# Patient Record
Sex: MALE | Race: WHITE | NOT HISPANIC OR LATINO | Employment: FULL TIME | URBAN - METROPOLITAN AREA
[De-identification: names, ages, dates, MRNs, and addresses within clinical notes are randomized per-mention and may not be internally consistent; named-entity substitution may affect disease eponyms.]

---

## 2021-02-10 ENCOUNTER — SLEEP CENTER VISIT (OUTPATIENT)
Dept: SLEEP MEDICINE | Facility: MEDICAL CENTER | Age: 41
End: 2021-02-10
Payer: COMMERCIAL

## 2021-02-10 VITALS
RESPIRATION RATE: 14 BRPM | BODY MASS INDEX: 23.28 KG/M2 | OXYGEN SATURATION: 98 % | HEIGHT: 69 IN | DIASTOLIC BLOOD PRESSURE: 84 MMHG | SYSTOLIC BLOOD PRESSURE: 128 MMHG | HEART RATE: 78 BPM | WEIGHT: 157.2 LBS

## 2021-02-10 DIAGNOSIS — G47.30 SLEEP DISORDER BREATHING: ICD-10-CM

## 2021-02-10 DIAGNOSIS — F51.5 NIGHTMARES: ICD-10-CM

## 2021-02-10 DIAGNOSIS — G47.10 HYPERSOMNIA: ICD-10-CM

## 2021-02-10 PROCEDURE — 99204 OFFICE O/P NEW MOD 45 MIN: CPT | Performed by: FAMILY MEDICINE

## 2021-02-10 RX ORDER — OMEPRAZOLE 40 MG/1
40 CAPSULE, DELAYED RELEASE ORAL
COMMUNITY
Start: 2021-02-05 | End: 2021-05-06

## 2021-02-10 NOTE — PROGRESS NOTES
"  [facility]  Consult Note     Date: 2/10/2021 / Time: 2:11 PM    Patient ID:   Name:             Mert Carl   YOB: 1980  Age:                 40 y.o.  male   MRN:               [id number]      Thank you for requesting a sleep medicine consultation on Mert Carl at the sleep center. He presents today with the chief complaints of snoring, apneas, sleep walking, daytime sleepiness,  Night terrors and nightmares. He is referred by Cristina Appiah for evaluation and treatment of sleep disorder breathing.     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 At night,  Mert Carl goes to bed around 5-7 am on weekdays and on the weekends. He gets out of bed at 2 pm on weekdays and on the weekends.  He averages about 8-10 hrs of sleep on a good night and 4 hrs on a bad night. Pt has bad nights 1 night per week. He falls asleep within 1 minute. He wakes up about 3-4 times during the night due to no obvious reason and on average It takes him couple min to fall back asleep.    He is aware of snoring/breathing pauses/gasping or choking in sleep.  He  denies any symptoms of restless legs syndrome such as an \"urge to move\"  He  legs in the evening or bedtime. He  denies any symptoms of narcolepsy such as sleep paralysis or cataplexy, or any symptoms to suggest parasomnias such as acting out of dreams. He has nightmares and terrors. Twice a week. He has a hx of anxiety. Sometimes he remember his dream and other days he doesn't remember. He also has hx of sleep walking. He has been sleep walking since child botello. Currently it is decrease to once ever 6 months. He  has not used any medications for the sleep problem.    Overall, michelle does finds his sleep refreshing.  In terms of  excessive daytime sleepiness,  He complains of sleepiness while  at work, while reading or watching TV and while driving. He does take regular naps. The naps are usually 60 min " "long.He drinks about 1 caffeinated beverages per day.      REVIEW OF SYSTEMS:       Constitutional: Denies fevers, Denies weight changes  Eyes: Denies changes in vision, no eye pain  Ears/Nose/Throat/Mouth: Denies nasal congestion or sore throat   Cardiovascular: Denies chest pain or palpitations   Respiratory: Denies shortness of breath , Denies cough  Gastrointestinal/Hepatic: Denies abdominal pain, nausea, vomiting  Musculoskeletal/Rheum: Denies  joint pain and swelling   Skin/Breast: Denies rash  Neurological: Denies headache, confusion, memory loss or focal weakness/parasthesias  Psychiatric: denies mood disorder     Comprehensive review of systems form is reviewed with the patient and is attached in the EMR.     PMH:  has a past medical history of Chickenpox.  MEDS:   Current Outpatient Medications:   •  omeprazole (PRILOSEC) 40 MG delayed-release capsule, Take 40 mg by mouth., Disp: , Rfl:   ALLERGIES: No Known Allergies  SURGHX: History reviewed. No pertinent surgical history.  SOCHX:  reports that he has never smoked. He has never used smokeless tobacco. He reports current alcohol use. He reports previous drug use.  FH:   Family History   Problem Relation Age of Onset   • Sleep Apnea Neg Hx        Physical Exam:  Vitals/ General Appearance:   Weight/BMI: Body mass index is 23.21 kg/m².  /84 (BP Location: Right arm, Patient Position: Sitting, BP Cuff Size: Adult)   Pulse 78   Resp 14   Ht 1.753 m (5' 9\")   Wt 71.3 kg (157 lb 3.2 oz)   SpO2 98%   Vitals:    02/10/21 1359   BP: 128/84   BP Location: Right arm   Patient Position: Sitting   BP Cuff Size: Adult   Pulse: 78   Resp: 14   SpO2: 98%   Weight: 71.3 kg (157 lb 3.2 oz)   Height: 1.753 m (5' 9\")           Constitutional: Alert, no distress, well-groomed.  Skin: No rashes in visible areas.  Eye: Round. Conjunctiva clear, lids normal. No icterus.   ENMT: Lips pink without lesions, good dentition, moist mucous membranes. Phonation normal.  Neck: " No masses, no thyromegaly. Moves freely without pain.  CV: Pulse as reported by patient  Respiratory: Unlabored respiratory effort, no cough or audible wheeze  Psych: Alert and oriented x3, normal affect and mood.   INVESTIGATIONS:       ASSESSMENT AND PLAN     1. He  has symptoms of Obstructive Sleep Apnea (JUAN MANUEL).     The pathophysiology of JUAN MANUEL and the increased risk of cardiovascular morbidity from untreated JUAN MANUEL is discussed in detail with the patient.   We have discussed diagnostic options including in-laboratory, attended polysomnography and home sleep testing. We have also discussed treatment options including airway pressurization, reconstructive otolaryngologic surgery, dental appliances and weight management.       Subsequently,treatment options will be discussed based on the diagnostic study. Meanwhile, He is urged to avoid supine sleep, weight gain and alcoholic beverages since all of these can worsen JUAN MANUEL. He is cautioned against drowsy driving. If He feels sleepy while driving, He must pull over for a break/nap, rather than persist on the road, in the interest of He own safety and that of others on the road.    Plan  -  He  will be scheduled for an overnight PSG to assess sleep related  breathing disorder.    2.  Regarding treatment of other past medical problems and general health maintenance,  He is urged to follow up with PCP.

## 2021-02-17 PROCEDURE — RXMED WILLOW AMBULATORY MEDICATION CHARGE: Performed by: NURSE PRACTITIONER

## 2021-02-18 ENCOUNTER — PHARMACY VISIT (OUTPATIENT)
Dept: PHARMACY | Facility: MEDICAL CENTER | Age: 41
End: 2021-02-18
Payer: COMMERCIAL

## 2021-02-23 ENCOUNTER — HOSPITAL ENCOUNTER (EMERGENCY)
Facility: MEDICAL CENTER | Age: 41
End: 2021-02-24
Attending: EMERGENCY MEDICINE
Payer: COMMERCIAL

## 2021-02-23 VITALS
BODY MASS INDEX: 23.58 KG/M2 | HEART RATE: 70 BPM | TEMPERATURE: 98.2 F | RESPIRATION RATE: 16 BRPM | DIASTOLIC BLOOD PRESSURE: 98 MMHG | WEIGHT: 159.17 LBS | SYSTOLIC BLOOD PRESSURE: 165 MMHG | HEIGHT: 69 IN | OXYGEN SATURATION: 99 %

## 2021-02-23 DIAGNOSIS — S51.812A LACERATION OF LEFT FOREARM, INITIAL ENCOUNTER: ICD-10-CM

## 2021-02-23 PROCEDURE — 303747 HCHG EXTRA SUTURE

## 2021-02-23 PROCEDURE — 90471 IMMUNIZATION ADMIN: CPT

## 2021-02-23 PROCEDURE — 700111 HCHG RX REV CODE 636 W/ 250 OVERRIDE (IP): Performed by: EMERGENCY MEDICINE

## 2021-02-23 PROCEDURE — 90715 TDAP VACCINE 7 YRS/> IM: CPT | Performed by: EMERGENCY MEDICINE

## 2021-02-23 PROCEDURE — 304999 HCHG REPAIR-SIMPLE/INTERMED LEVEL 1

## 2021-02-23 PROCEDURE — 99282 EMERGENCY DEPT VISIT SF MDM: CPT

## 2021-02-23 PROCEDURE — RXMED WILLOW AMBULATORY MEDICATION CHARGE: Performed by: NURSE PRACTITIONER

## 2021-02-23 PROCEDURE — 304217 HCHG IRRIGATION SYSTEM

## 2021-02-23 RX ADMIN — CLOSTRIDIUM TETANI TOXOID ANTIGEN (FORMALDEHYDE INACTIVATED), CORYNEBACTERIUM DIPHTHERIAE TOXOID ANTIGEN (FORMALDEHYDE INACTIVATED), BORDETELLA PERTUSSIS TOXOID ANTIGEN (GLUTARALDEHYDE INACTIVATED), BORDETELLA PERTUSSIS FILAMENTOUS HEMAGGLUTININ ANTIGEN (FORMALDEHYDE INACTIVATED), BORDETELLA PERTUSSIS PERTACTIN ANTIGEN, AND BORDETELLA PERTUSSIS FIMBRIAE 2/3 ANTIGEN 0.5 ML: 5; 2; 2.5; 5; 3; 5 INJECTION, SUSPENSION INTRAMUSCULAR at 23:57

## 2021-02-24 ASSESSMENT — LIFESTYLE VARIABLES
EVER FELT BAD OR GUILTY ABOUT YOUR DRINKING: NO
HAVE YOU EVER FELT YOU SHOULD CUT DOWN ON YOUR DRINKING: NO
TOTAL SCORE: 0
HAVE PEOPLE ANNOYED YOU BY CRITICIZING YOUR DRINKING: NO
DO YOU DRINK ALCOHOL: YES
EVER HAD A DRINK FIRST THING IN THE MORNING TO STEADY YOUR NERVES TO GET RID OF A HANGOVER: NO
TOTAL SCORE: 0
CONSUMPTION TOTAL: INCOMPLETE
TOTAL SCORE: 0

## 2021-02-24 NOTE — ED NOTES
Tetanus administered. Discharge education provided. Discharge paperwork signed and given to pt. All questions answered. All belongings with pt. Pt ambulated to lobby unassisted.

## 2021-02-24 NOTE — ED TRIAGE NOTES
[facility]  40 [facility]  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 Patient states it was bleeding heavily at the time of injury. Clean gauze dressing in place controlling the bleeding. Unknown tetanus status. Patient reporting some numbness to the site of the wound.        Vitals:    02/23/21 2119   BP: (Abnormal) 165/98   Pulse: 70   Resp: 16   Temp: 36.8 °C (98.2 °F)   SpO2: 99%     Triage process explained to patient, apologized for wait time, and returned to lobby.  Pt informed to notify staff of any change in condition. NAD at this time.

## 2021-02-24 NOTE — DISCHARGE INSTRUCTIONS
Your stitches should be removed in 10 days. Return immediately to the emergency department at the first sign of infection including increasing redness, increasing pain, pus in the wound, fever or if you have any other concerns.

## 2021-02-24 NOTE — ED PROVIDER NOTES
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 Patient states it was bleeding heavily at the time of injury. Clean gauze dressing in place controlling the bleeding. Unknown tetanus status. Patient reporting some numbness to the site of the wound.        [facility] [place]  Mert Carl II is a 40 y.o. male who presents for evaluation of a laceration on the left wrist.  He was cleaning a glass while at work, he works there as a .  He has some decrease sensation just distal to the laceration but no weakness or numbness of the fingers or hand.  Last tetanus shot was likely greater than 5 years ago.  No medical problems, no other complaints    REVIEW OF SYSTEMS  Negative for fever, weakness    PAST MEDICAL HISTORY   has a past medical history of Chickenpox.    SOCIAL HISTORY  Social History     Tobacco Use   • Smoking status: Never Smoker   • Smokeless tobacco: Never Used   Substance and Sexual Activity   • Alcohol use: Yes     Comment: 2-3 a day    • Drug use: Not Currently   • Sexual activity: Not on file       SURGICAL HISTORY  patient denies any surgical history    CURRENT MEDICATIONS  I personally reviewed the medication list in the charting documentation.     ALLERGIES  No Known Allergies    PHYSICAL EXAM  VITAL SIGNS: BP (!) 165/98   Pulse 70   Temp 36.8 °C (98.2 °F) (Temporal)   Resp 16   Ht 1.753 m (5' 9\")   Wt 72.2 kg (159 lb 2.8 oz)   SpO2 99%   BMI 23.51 kg/m²   Constitutional: Alert in no apparent distress.  HENT: No signs of trauma.   Eyes: Conjunctiva normal, Non-icteric.   Chest: Normal nonlabored respirations  Skin: No erythema, No rash.   Musculoskeletal: Inspection of the volar aspect of the left wrist reveals a 3 cm subcutaneous laceration involving the radial aspect into the midline, it horizontally oriented, subcutaneous tissue is exposed, no obvious " structural tenderness structures.  No active bleeding.  Has decreased sensation just distal to the laceration but normal sensation otherwise in the radial, ulnar and median nerve distributions.  Normal motor function of the radial, ulnar and median nerves.  Psychiatric: Affect normal, Judgment normal.    DIAGNOSTIC STUDIES / PROCEDURES    Laceration Repair Procedure Note    Indication: Laceration    Procedure: The patient was placed in the appropriate position and anesthesia around the laceration was obtained by infiltration using 1% Lidocaine with epinephrine. The area was then irrigated with normal saline and explored with no foreign bodies discovered. The laceration was closed with 5-0 Ethilon using interrupted sutures. There were no additional lacerations requiring repair.     Total repaired wound length: 3 cm.     Other Items: Suture count: 8    The patient tolerated the procedure well.    Complications: None        COURSE & MEDICAL DECISION MAKING  Pertinent Labs & Imaging studies reviewed. (See chart for details)    Encounter Summary: This is a 40 y.o. male with laceration of the volar surface of the left wrist, no tendon involvement, had no arterial bleeding, decreased sensation just distal to the laceration itself but normal sensory function in the radial, ulnar and median nerve distributions.  His tetanus was not definitively within the past 5 years so was updated here in the emergency department.  Laceration was repaired with sutures.  Strict return instructions have been discussed, stable and appropriate for discharge.      DISPOSITION: Discharge Home      FINAL IMPRESSION  1. Laceration of left forearm, initial encounter        This dictation was created using voice recognition software. The accuracy of the dictation is limited to the abilities of the software. I expect there may be some errors of grammar and possibly content. The nursing notes were reviewed and certain aspects of this information were  incorporated into this note.    Electronically signed by: Mayo Culver M.D., 2/23/2021 11:35 PM

## 2021-02-27 ENCOUNTER — SLEEP STUDY (OUTPATIENT)
Dept: SLEEP MEDICINE | Facility: MEDICAL CENTER | Age: 41
End: 2021-02-27
Payer: COMMERCIAL

## 2021-02-27 ENCOUNTER — HOSPITAL ENCOUNTER (OUTPATIENT)
Dept: LAB | Facility: MEDICAL CENTER | Age: 41
End: 2021-02-27
Attending: NURSE PRACTITIONER
Payer: COMMERCIAL

## 2021-02-27 DIAGNOSIS — F51.5 NIGHTMARES: ICD-10-CM

## 2021-02-27 DIAGNOSIS — G47.10 HYPERSOMNIA: ICD-10-CM

## 2021-02-27 DIAGNOSIS — G47.30 SLEEP DISORDER BREATHING: ICD-10-CM

## 2021-02-27 LAB
ALBUMIN SERPL BCP-MCNC: 4 G/DL (ref 3.2–4.9)
ALBUMIN/GLOB SERPL: 1.3 G/DL
ALP SERPL-CCNC: 79 U/L (ref 30–99)
ALT SERPL-CCNC: 30 U/L (ref 2–50)
ANION GAP SERPL CALC-SCNC: 9 MMOL/L (ref 7–16)
AST SERPL-CCNC: 24 U/L (ref 12–45)
BASOPHILS # BLD AUTO: 0.8 % (ref 0–1.8)
BASOPHILS # BLD: 0.06 K/UL (ref 0–0.12)
BILIRUB SERPL-MCNC: 0.8 MG/DL (ref 0.1–1.5)
BUN SERPL-MCNC: 14 MG/DL (ref 8–22)
CALCIUM SERPL-MCNC: 9.5 MG/DL (ref 8.5–10.5)
CHLORIDE SERPL-SCNC: 99 MMOL/L (ref 96–112)
CHOLEST SERPL-MCNC: 162 MG/DL (ref 100–199)
CO2 SERPL-SCNC: 25 MMOL/L (ref 20–33)
CREAT SERPL-MCNC: 0.96 MG/DL (ref 0.5–1.4)
EOSINOPHIL # BLD AUTO: 0.11 K/UL (ref 0–0.51)
EOSINOPHIL NFR BLD: 1.5 % (ref 0–6.9)
ERYTHROCYTE [DISTWIDTH] IN BLOOD BY AUTOMATED COUNT: 43 FL (ref 35.9–50)
GLOBULIN SER CALC-MCNC: 3.1 G/DL (ref 1.9–3.5)
GLUCOSE SERPL-MCNC: 93 MG/DL (ref 65–99)
HCT VFR BLD AUTO: 40 % (ref 42–52)
HDLC SERPL-MCNC: 65 MG/DL
HGB BLD-MCNC: 13.5 G/DL (ref 14–18)
IMM GRANULOCYTES # BLD AUTO: 0.02 K/UL (ref 0–0.11)
IMM GRANULOCYTES NFR BLD AUTO: 0.3 % (ref 0–0.9)
LDLC SERPL CALC-MCNC: 82 MG/DL
LYMPHOCYTES # BLD AUTO: 1.19 K/UL (ref 1–4.8)
LYMPHOCYTES NFR BLD: 16.6 % (ref 22–41)
MCH RBC QN AUTO: 30.9 PG (ref 27–33)
MCHC RBC AUTO-ENTMCNC: 33.8 G/DL (ref 33.7–35.3)
MCV RBC AUTO: 91.5 FL (ref 81.4–97.8)
MONOCYTES # BLD AUTO: 1.37 K/UL (ref 0–0.85)
MONOCYTES NFR BLD AUTO: 19.1 % (ref 0–13.4)
NEUTROPHILS # BLD AUTO: 4.44 K/UL (ref 1.82–7.42)
NEUTROPHILS NFR BLD: 61.7 % (ref 44–72)
NRBC # BLD AUTO: 0 K/UL
NRBC BLD-RTO: 0 /100 WBC
PLATELET # BLD AUTO: 259 K/UL (ref 164–446)
PMV BLD AUTO: 10.7 FL (ref 9–12.9)
POTASSIUM SERPL-SCNC: 3.4 MMOL/L (ref 3.6–5.5)
PROT SERPL-MCNC: 7.1 G/DL (ref 6–8.2)
RBC # BLD AUTO: 4.37 M/UL (ref 4.7–6.1)
SODIUM SERPL-SCNC: 133 MMOL/L (ref 135–145)
TRIGL SERPL-MCNC: 75 MG/DL (ref 0–149)
TSH SERPL DL<=0.005 MIU/L-ACNC: 3.44 UIU/ML (ref 0.38–5.33)
WBC # BLD AUTO: 7.2 K/UL (ref 4.8–10.8)

## 2021-02-27 PROCEDURE — 36415 COLL VENOUS BLD VENIPUNCTURE: CPT

## 2021-02-27 PROCEDURE — 80061 LIPID PANEL: CPT

## 2021-02-27 PROCEDURE — 84443 ASSAY THYROID STIM HORMONE: CPT

## 2021-02-27 PROCEDURE — 85025 COMPLETE CBC W/AUTO DIFF WBC: CPT

## 2021-02-27 PROCEDURE — 80053 COMPREHEN METABOLIC PANEL: CPT

## 2021-02-28 ENCOUNTER — OFFICE VISIT (OUTPATIENT)
Dept: URGENT CARE | Facility: CLINIC | Age: 41
End: 2021-02-28
Payer: COMMERCIAL

## 2021-02-28 ENCOUNTER — OCCUPATIONAL MEDICINE (OUTPATIENT)
Dept: URGENT CARE | Facility: CLINIC | Age: 41
End: 2021-02-28
Payer: COMMERCIAL

## 2021-02-28 VITALS
SYSTOLIC BLOOD PRESSURE: 140 MMHG | DIASTOLIC BLOOD PRESSURE: 82 MMHG | TEMPERATURE: 96.7 F | HEIGHT: 69 IN | WEIGHT: 160 LBS | BODY MASS INDEX: 23.7 KG/M2 | HEART RATE: 56 BPM | OXYGEN SATURATION: 95 % | RESPIRATION RATE: 14 BRPM

## 2021-02-28 DIAGNOSIS — T14.8XXA INFECTED WOUND: ICD-10-CM

## 2021-02-28 DIAGNOSIS — L08.9 INFECTED WOUND: ICD-10-CM
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
## 2021-02-28 DIAGNOSIS — S61.512D LACERATION OF LEFT WRIST, SUBSEQUENT ENCOUNTER: ICD-10-CM

## 2021-02-28 PROCEDURE — 99203 OFFICE O/P NEW LOW 30 MIN: CPT | Performed by: NURSE PRACTITIONER

## 2021-02-28 RX ORDER — SULFAMETHOXAZOLE AND TRIMETHOPRIM 800; 160 MG/1; MG/1
1 TABLET ORAL 2 TIMES DAILY
Qty: 10 TABLET | Refills: 0 | Status: SHIPPED | OUTPATIENT
Start: 2021-02-28 | End: 2021-03-05

## 2021-02-28 RX ORDER — SULFAMETHOXAZOLE AND TRIMETHOPRIM 800; 160 MG/1; MG/1
1 TABLET ORAL 2 TIMES DAILY
Qty: 10 TABLET | Refills: 0 | Status: SHIPPED | OUTPATIENT
Start: 2021-02-28 | End: 2021-02-28 | Stop reason: SDUPTHER

## 2021-02-28 ASSESSMENT — ENCOUNTER SYMPTOMS
TINGLING: 0
FEVER: 0

## 2021-02-28 ASSESSMENT — LIFESTYLE VARIABLES: SUBSTANCE_ABUSE: 0

## 2021-02-28 ASSESSMENT — FIBROSIS 4 INDEX: FIB4 SCORE: 0.68

## 2021-02-28 NOTE — LETTER
EMPLOYEE’S CLAIM FOR COMPENSATION/ REPORT OF INITIAL TREATMENT  FORM C-4    EMPLOYEE’S CLAIM - PROVIDE ALL INFORMATION REQUESTED   First Name  Mert Last Name  Siddhartha Birthdate                    1980                Sex  male Claim Number   Home Address  [address] Age  40 y.o. Height   Weight   SSN     [place] [facility] Zip  [address] Telephone  [phone] (home)    Mailing Address  [address] Zip  [address] Primary Language Spoken  English    Insurer   Third Party   [facility]   Employee's Occupation (Job Title) When Injury or Occupational Disease Occurred      Employer's Name    [employer] Telephone   [phone]   [place]   [address] [place]   [address]   Date of Injury  2/23/2021               Hour of Injury  8:45 PM Date Employer Notified  2/23/2021 Last Day of Work after Injury     or Occupational Disease   Supervisor to Whom Injury     Reported  Wrightalfonso Nunez   Address or Location of Accident (if applicable)  [[employer] [place]]   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 (Be specific an answer in detail. Use additional sheet if necessary)  putting away glassware   If you believe that you have an occupational disease, when did you first have knowledge of the disability and it relationship to your employment?  02/23/2021 Witnesses to the Accident        Nature of Injury or Occupational Disease  Laceration  Part(s) of Body Injured or Affected  Wrist (L) and Hand (L), ,     I certify that the above is true and correct to the best of my knowledge and that I have provided this information in order to obtain the benefits of [place]   I hereby authorize any physician, chiropractor, surgeon, practitioner, or other person, any hospital, including [facility], any medical service organization, any insurance company, or other institution or organization to release to each other, any medical or other information, including benefits paid or payable, pertinent to this injury or disease, except information relative to diagnosis, treatment and/or counseling for AIDS, psychological conditions, alcohol or controlled substances, for which I must give specific authorization.  A Photostat of this authorization shall be as valid as the original.     Date   Place   Employee’s Signature   THIS REPORT MUST BE COMPLETED AND MAILED WITHIN 3 WORKING DAYS OF TREATMENT   Place  [facility]  Name of Facility  [facility]   Date  2/28/2021 Diagnosis  (S61.512D) Laceration of left wrist, subsequent encounter  (T14.8XXA,  L08.9) Infected wound  (Y99.0) Work related injury Is there evidence the injured employee was under the              influence of alcohol and/or another controlled substance at the time of accident?   Hour  9:29 AM Description of Injury or Disease  Diagnoses of Laceration of left wrist, subsequent encounter, Infected wound, and Work related injury were pertinent to this visit.   Comments:UTD due to date of injury    Treatment  Suture repair done in ER.   Antibiotics RX   Have you advised the patient to remain off work five days or     more? No   X-Ray Findings      If Yes   From Date  To Date      From information given by the employee, together with medical evidence, can you directly connect this injury or occupational disease as job incurred?  Yes If No Full Duty    Yes Modified Duty      Is additional medical care by a physician indicated?  Yes If Modified Duty, Specify any Limitations / Restrictions      Do you know of any previous injury or disease contributing to this condition or occupational disease?      "                       No   Date  2/28/2021 Print Doctor’s Name   ROIBN Paige I certify the employer’s copy of  this form was mailed on:   Address  [address] Insurer’s Use Only     [place] Zip  [address]    Provider’s Tax ID Number  [id number] Telephone  Dept: [phone]      [id number] Childress  Signature:     Degree          ORIGINAL-TREATING PHYSICIAN OR CHIROPRACTOR    PAGE 2-INSURER/TPA    PAGE 3-EMPLOYER    PAGE 4-EMPLOYEE        Form C-4 (rev.10/07)           BRIEF DESCRIPTION OF RIGHTS AND BENEFITS  (Pursuant to [place] 616C.050)    Notice of Injury or Occupational Disease (Incident Report Form C-1): If an injury or occupational disease (OD) arises out of and in the course of employment, you must provide written notice to your employer as soon as practicable, but no later than 7 days after the accident or OD. Your employer shall maintain a sufficient supply of the required forms.    Claim for Compensation (Form C-4): If medical treatment is sought, the form C-4 is available at the place of initial treatment. A completed \"Claim for Compensation\" (Form C-4) must be filed within 90 days after an accident or OD. The treating physician or chiropractor must, within 3 working days after treatment, complete and mail to the employer, the employer's insurer and third-party , the Claim for Compensation.    Medical Treatment: If you require medical treatment for your on-the-job injury or OD, you may be required to select a physician or chiropractor from a list provided by your workers’ compensation insurer, if it has contracted with an Organization for Managed Care (MCO) or Preferred Provider Organization (PPO) or providers of health care. If your employer has not entered into a contract with an MCO or PPO, you may select a physician or chiropractor from the Panel of Physicians and Chiropractors. Any medical costs related to your " industrial injury or OD will be paid by your insurer.    Temporary Total Disability (TTD): If your doctor has certified that you are unable to work for a period of at least 5 consecutive days, or 5 cumulative days in a 20-day period, or places restrictions on you that your employer does not accommodate, you may be entitled to TTD compensation.    Temporary Partial Disability (TPD): If the wage you receive upon reemployment is less than the compensation for TTD to which you are entitled, the insurer may be required to pay you TPD compensation to make up the difference. TPD can only be paid for a maximum of 24 months.    Permanent Partial Disability (PPD): When your medical condition is stable and there is an indication of a PPD as a result of your injury or OD, within 30 days, your insurer must arrange for an evaluation by a rating physician or chiropractor to determine the degree of your PPD. The amount of your PPD award depends on the date of injury, the results of the PPD evaluation, your age and wage.    Permanent Total Disability (PTD): If you are medically certified by a treating physician or chiropractor as permanently and totally disabled and have been granted a PTD status by your insurer, you are entitled to receive monthly benefits not to exceed 66 2/3% of your average monthly wage. The amount of your PTD payments is subject to reduction if you previously received a lump-sum PPD award.    Vocational Rehabilitation Services: You may be eligible for vocational rehabilitation services if you are unable to return to the job due to a permanent physical impairment or permanent restrictions as a result of your injury or occupational disease.    Transportation and Per Bianka Reimbursement: You may be eligible for travel expenses and per bianka associated with medical treatment.    Reopening: You may be able to reopen your claim if your condition worsens after claim closure.     Appeal Process: If you disagree with a  written determination issued by the insurer or the insurer does not respond to your request, you may appeal to the Department of Administration, , by following the instructions contained in your determination letter. You must appeal the determination within 70 days from the date of the determination letter at [address], [address], [place], [place] [address], or [address], [address], [place], [place] [address]. If you disagree with the  decision, you may appeal to the Department of Administration, . You must file your appeal within 30 days from the date of the  decision letter at [address], [address], [place], [place] [address], or [address], [address], [place], [place] [address]. If you disagree with a decision of an , you may file a petition for judicial review with the District Court. You must do so within 30 days of the Appeal Officer’s decision. You may be represented by an  at your own expense or you may contact the [facility] for possible representation.    [place]  for Injured Workers (NAIW): If you disagree with a  decision, you may request that NAIW represent you without charge at an  Hearing. For information regarding denial of benefits, you may contact the [facility] at: [address], [address], [place] [address], [phone], or [address], [address], [place] [address], [phone]    To File a Complaint with the Division: If you wish to file a complaint with the  of the Division of Industrial Relations (DIR),  please contact the Workers’ Compensation Section, [address], [place], [place] [address], telephone ([phone]) [phone], or [address], [address], [place], [place] [address], telephone [phone]) [phone].    For assistance with Workers’ Compensation Issues: You may contact the [facility]  [place] Office for Consumer Health Assistance, [address] [place] [address], [address], [place] [address], Toll Free 1-636.323.7824, Web site: http://[facility].[place].gov/Programs/JO E-mail: [email or web address]              __________________________________________________________________                                    _________________            Employee Name / Signature                                                                                                                            Date                                                                                                                                                                                                                              D-2 (rev. 10/20)

## 2021-02-28 NOTE — LETTER
[facility] [place] [address] [facility] [address] [place] [address]  Phone:  [phone] - Fax:  [phone]   [facility] Progress Report and Disability Certification  Date of Service: 2/28/2021   No Show:  No  Date / Time of Next Visit: 3/5/2021 @ 2PM   Claim Information   Patient Name: Mert Carl II  Claim Number:     Employer:  [employer] Date of Injury: 2/23/2021     Insurer / TPA: [employer]  ID / SSN:     Occupation:   Diagnosis: Diagnoses of Laceration of left wrist, subsequent encounter, Infected wound, and Work related injury were pertinent to this visit.    Medical Information   Related to Industrial Injury? 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 Sustained laceration to left wrist. Seen in ER on 02/23/2021 and had suture repair and Tdap booster. Told ER that is was injury sustained while working. No work comp papers done at that time.   No other employers.   He is here today because wound is becoming increasingly red and painful. He is washing it daily with mild soap and water, applying neosporin ointment and covering with non-stick telfa then ACE wrap. Inially it was very swollen. Swelling is improved but there is more ecchymosis of wrist.   Pain is only present with movement of his wrist. Denies fever, chills, bony tenderness, drainage from wound or tingling sensation ( hands, wrist or finger).    Objective Findings: VSS. + erythema surrounding sutures that are in place in volar aspect of Left wrist. Sutures are intact. + DARIO and ecchymosis. No drainage.     Pre-Existing Condition(s):     Assessment:   Condition Worsened    Status: Additional Care Required  Permanent Disability:No    Plan: Medication    Diagnostics:      Comments:       Disability Information   Status: Released to Full Duty    From:  2/28/2021  Through: 3/5/2021 Restrictions are:     Physical Restrictions     Sitting:    Standing:    Stooping:    Bending:      Squatting:    Walking:    Climbing:    Pushing:      Pulling:    Other:    Reaching Above Shoulder (L):   Reaching Above Shoulder (R):       Reaching Below Shoulder (L):    Reaching Below Shoulder (R):      Not to exceed Weight Limits   Carrying(hrs):   Weight Limit(lb):   Lifting(hrs):   Weight  Limit(lb):     Comments:      Repetitive Actions   Hands: i.e. Fine Manipulations from Grasping:     Feet: i.e. Operating Foot Controls:     Driving / Operate Machinery:     Provider Name:   Marianna Ayala AMannyPSARAI Physician Signature:  Physician Name:     Clinic Name / Location: [place] [address]  [place] [address] Clinic Phone Number: Dept: [phone]   Appointment Time: 9:30 Am Visit Start Time: 9:29 AM   Check-In Time:  9:20 Am Visit Discharge Time:  10:20AM   Original-Treating Physician or Chiropractor    Page 2-Insurer/TPA    Page 3-Employer   0 Page 4-Employee

## 2021-02-28 NOTE — PROGRESS NOTES
Subjective:      Mert Carl II is a 40 y.o. male who presents with Laceration (wrist)       Reviewed past medical, surgical and family history. Reviewed prescription and OTC medications with patient in electronic health record today  Allergies: Patient has no known allergies.            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 Sustained laceration to left wrist. Seen in ER on 02/23/2021 and had suture repair and Tdap booster. Told ER that is was injury sustained while working. No work comp papers done at that time.   No other employers.   He is here today because wound is becoming increasingly red and painful. He is washing it daily with mild soap and water, applying neosporin ointment and covering with non-stick telfa then ACE wrap. Inially it was very swollen. Swelling is improved but there is more ecchymosis of wrist.   Pain is only present with movement of his wrist. Denies fever, chills, bony tenderness, drainage from wound or tingling sensation ( hands, wrist or finger).     Review of Systems   Constitutional: Negative for fever and malaise/fatigue.   Musculoskeletal: Negative for joint pain.   Neurological: Negative for tingling.   Psychiatric/Behavioral: Negative for substance abuse.          Objective:     VSS today     Physical Exam  Vitals reviewed.   Constitutional:       General: He is not in acute distress.     Appearance: Normal appearance. He is normal weight. He is not ill-appearing.   Cardiovascular:      Rate and Rhythm: Normal rate and regular rhythm.      Pulses: Normal pulses.   Pulmonary:      Effort: Pulmonary effort is normal.   Musculoskeletal:      Cervical back: Normal range of motion and neck supple.   Skin:     General: Skin is warm.      Capillary Refill: Capillary refill takes less than 2 seconds.      Findings: Ecchymosis, erythema and signs of injury present.          Neurological:      Mental Status: He is alert and oriented to person,  place, and time.   Psychiatric:         Mood and Affect: Mood normal.         Behavior: Behavior normal.         Thought Content: Thought content normal.         VSS. + erythema surrounding sutures that are in place in volar aspect of Left wrist. Sutures are intact. + DARIO and ecchymosis. No drainage.         Assessment/Plan:        1. Laceration of left wrist, subsequent encounter  mupirocin (BACTROBAN) 2 % Ointment    sulfamethoxazole-trimethoprim (BACTRIM DS) 800-160 MG tablet    DISCONTINUED: sulfamethoxazole-trimethoprim (BACTRIM DS) 800-160 MG tablet   2. Infected wound  mupirocin (BACTROBAN) 2 % Ointment    sulfamethoxazole-trimethoprim (BACTRIM DS) 800-160 MG tablet    DISCONTINUED: sulfamethoxazole-trimethoprim (BACTRIM DS) 800-160 MG tablet   3. Work related injury  mupirocin (BACTROBAN) 2 % Ointment       See [place] D39 and C4  ( not initially done in ER)     Educated in proper administration of medication(s) ordered today including safety, possible SE, risks, benefits, rationale and alternatives to therapy.      The patient is alerted to watch for any signs of infection (redness, pus, pain, increased swelling or fever) and call if such occurs. Home wound care instructions are provided.     Released to full duty work     OTC  analgesic of choice (acetaminophen or NSAID). Follow manufactures dosing and safety precautions.

## 2021-03-01 ENCOUNTER — PHARMACY VISIT (OUTPATIENT)
Dept: PHARMACY | Facility: MEDICAL CENTER | Age: 41
End: 2021-03-01
Payer: COMMERCIAL

## 2021-03-02 NOTE — PROCEDURES
Technical summary: The patient underwent a split-night polysomnogram. This was a 16 channel montage study to include a 6 channel EEG, a 2 channel EOG, and chin EMG, left and right leg EMG, a snore channel, a nasal pressure transducer, and a nasal oral airflow  thermistor and a CFLOW pressure transducer.   Respiratory effort was assessed with the use of a thoracic and abdominal monitor and overnight oximetry was obtained. Audio and video recordings were reviewed. This was a fully attended study and sleep stage scoring was performed. The test was technically adequate.    Scoring Criteria: A modification of the the AASM Manual for the Scoring of Sleep and Associated Events, 2012, was used.   Obstructive apnea was scored by cessation of airflow for at least 10 seconds with continuing respiratory effort.  Central apnea was scored by cessation of airflow for at least 10 seconds with no effort.  Hypopnea was scored by a 30% or more reduction in airflow for at least 10 seconds accompanied by an arterial oxygen desaturation of 3% or more.  (For Medicare patients, hypopneas were scored by a 30% or more reduction in airflow for at least 10 seconds accompanied by an arterial oxygen saturation of 4% or more, as required by their insurance, CMS.      Interpretation:  Study start time was 08:40:10 PM. Total recording time was 4h 56.5m (296 minutes) with a total sleep time of 2h 30.5m (150 minutes) resulting in a sleep efficiency of 50.76%.  Sleep latency from the start fo the study was 11 minutes minutes and REM latency from sleep onset was 03 minutes minutes.    Respiratory:   There were 3 apneas in total consisting of 0 obstructive apneas, 0 mixed apneas, and 3 central apneas. There were 126 hypopneas in total.  The apnea index was 1.20 per hour and the hypopnea index was 50.23 per hour.  The overall AHI was 51.4, with a REM AHI of 52.71, and a supine AHI of 57.01.  39% of the events were central in nature.    Limb  Movements:  There were a total of 58 periodic leg movements, of which 25 were PLMS arousals. This resulted in a PLMS index of 23.1 and a PLMS arousal index of 10.0    Oximetry:  The mean SaO2 was 97.0% for the diagnostic portion of the study, with a minimum SaO2 of 89.0%.   Treatment:  Interpretation:  Treatment recording time was 4h 28.5m (268 minutes) with a total sleep time of 3h 26.0m (206 minutes) resulting in a sleep efficiency of 76.7%.   Sleep latency from the start of treatment was 01 minutes minutes and REM latency from sleep onset was 0h 47.5m minutes.   The patient had 219 arousals in total for an arousal index of 63.8.    Respiratory:   There were 35 apneas in total consisting of 3 obstructive apneas, 32 central apneas, and 0 mixed apneas for an apnea index of 10.19.   The patient had 72 hypopneas in total, which resulted in a hypopnea index of 20.97.   The overall AHI was 31.17, with a REM AHI of 3.75, and a supine AHI of 35.76.     Limb Movements:  There were a total of 204 periodic leg movements, of which 98 were PLMS arousals. This resulted in a PLMS index of 59.4 and a PLMS arousal index of 28.5.    Oximetry:  The mean SaO2 during treatment was 97.0%, with a minimum oxygen saturation of 87.0%.    CPAP was tried from 5 to 12cm H2O.BiPAP was tried from 10/6 to 19/15cm H2O       CPAP Titration:  Due to the significant number of obstructive respiratory events observed during the diagnostic portion of the study a CPAP titration trial was performed during the second half of the night. The CPAP pressure was initiated at 5 cm of water and the pressure was increased in an attempt to eliminate all sleep disordered breathing and snoring. CPAP was increased to 12  Cm before switching to BiPAP. The BiPAP was titrated between 10/6 cm to 19/15 cm. The best tolerated pressure was BiPAp 18/13 cm. At this pressure the patient was observed in the supine REM sleep stage.The apnea hypopnea index improved to 3.53/hr  with improved O2 mikal of  95%.He spent 0 % of sleep time below 89% O2 saturation Snoring was resolved. The patient utilized medium F30 mask with heated humidification. The CPAP was well-tolerated and there was minimal air leaks. No supplemental oxygen was required.    Impression:  1.  Severe obstructive sleep apnea with AHI of 51.4/hr and O2 mikal 90 %. Due to severity of the disease he met the split study protocol. The titration started with CPAP 5 cm and the best tolerated was BiPAP 18/13 cm. The AHI improved to 3.53/hr with improved O2 mikal of 95% and average O2 saturation of 98 %.     2.  treatment emergent central apneas     Recommendations:  I recommend BiPAP 18/14 cm with F30 mask. I also recommend 30 day compliance download to assess the efficacy to the recommended pressure, measure leak, apnea hypopnea index and compliance for further outpatient monitoring and management of CPAP therapy. In some cases alternative treatment options may prove effective in resolving sleep apnea and these options include upper airway surgery, the use of a dental orthotic or weight loss and positional therapy. Clinical correlation is required. In general patients with sleep apnea are advised to avoid alcohol and sedatives and to not operate a motor vehicle while drowsy and are at a greater risk for cardiovascular disease.

## 2021-03-05 ENCOUNTER — OFFICE VISIT (OUTPATIENT)
Dept: SLEEP MEDICINE | Facility: MEDICAL CENTER | Age: 41
End: 2021-03-05
Payer: COMMERCIAL

## 2021-03-05 ENCOUNTER — OCCUPATIONAL MEDICINE (OUTPATIENT)
Dept: URGENT CARE | Facility: CLINIC | Age: 41
End: 2021-03-05
Payer: COMMERCIAL

## 2021-03-05 VITALS
HEIGHT: 69 IN | HEART RATE: 61 BPM | TEMPERATURE: 98 F | OXYGEN SATURATION: 96 % | DIASTOLIC BLOOD PRESSURE: 90 MMHG | BODY MASS INDEX: 23.55 KG/M2 | WEIGHT: 159 LBS | SYSTOLIC BLOOD PRESSURE: 130 MMHG | RESPIRATION RATE: 18 BRPM

## 2021-03-05 VITALS
SYSTOLIC BLOOD PRESSURE: 148 MMHG | DIASTOLIC BLOOD PRESSURE: 80 MMHG | WEIGHT: 157.1 LBS | BODY MASS INDEX: 23.27 KG/M2 | HEIGHT: 69 IN | RESPIRATION RATE: 20 BRPM | OXYGEN SATURATION: 97 % | HEART RATE: 62 BPM

## 2021-03-05 DIAGNOSIS — G47.26 SHIFTING SLEEP-WORK SCHEDULE: ICD-10-CM

## 2021-03-05 DIAGNOSIS — L08.9 WOUND INFECTION: ICD-10-CM

## 2021-03-05 DIAGNOSIS — G47.33 OSA (OBSTRUCTIVE SLEEP APNEA): ICD-10-CM

## 2021-03-05 DIAGNOSIS — S61.512D LACERATION OF LEFT WRIST, SUBSEQUENT ENCOUNTER: ICD-10-CM

## 2021-03-05 DIAGNOSIS — T14.8XXA WOUND INFECTION: ICD-10-CM

## 2021-03-05 PROCEDURE — 99214 OFFICE O/P EST MOD 30 MIN: CPT | Performed by: NURSE PRACTITIONER

## 2021-03-05 PROCEDURE — 99213 OFFICE O/P EST LOW 20 MIN: CPT | Performed by: INTERNAL MEDICINE

## 2021-03-05 RX ORDER — CEPHALEXIN 500 MG/1
500 CAPSULE ORAL 4 TIMES DAILY
Qty: 20 CAPSULE | Refills: 0 | Status: SHIPPED | OUTPATIENT
Start: 2021-03-05 | End: 2021-03-10 | Stop reason: SDUPTHER

## 2021-03-05 ASSESSMENT — FIBROSIS 4 INDEX
FIB4 SCORE: 0.68
FIB4 SCORE: 0.68

## 2021-03-05 ASSESSMENT — ENCOUNTER SYMPTOMS
FEVER: 0
CONSTITUTIONAL NEGATIVE: 1
CHILLS: 0

## 2021-03-05 ASSESSMENT — VISUAL ACUITY: OU: 1

## 2021-03-05 NOTE — PROGRESS NOTES
"Subjective:     Mert Carl II is a 40 y.o. male who presents for Other (work comp follow up left wrist laceration)    Initial ER visit 2/23/2021 reviewed for continuity of care:    \"Mert Carl II is a 40 y.o. male who presents for evaluation of a laceration on the left wrist.  He was cleaning a glass while at work, he works there as a .  He has some decrease sensation just distal to the laceration but no weakness or numbness of the fingers or hand.  Last tetanus shot was likely greater than 5 years ago.  No medical problems, no other complaints\"    Left wrist laceration repaired with 8 sutures.    Follow-up UC visit today 3/5/2021:    This is patient's second follow-up visit.  Patient's last follow-up visit was on 2/28/2021.  Was noted to have redness and pain.  Was started on Bactrim and mupirocin ointment.  Finished Bactrim.  Continues to have some redness, swelling at the laceration.  Denies fever, chills, or red streaking.  No longer sees pus.    Patient was screened prior to rooming and denied COVID-19 diagnosis or contact with a person who has been diagnosed or is suspected to have COVID-19. During this visit, appropriate PPE was worn, hand hygiene was performed, and the patient and any visitors were masked.    PMH: No pertinent past medical history to this problem  MEDS: Medications were reviewed in Epic  ALLERGIES: Allergies were reviewed in Epic  SOCHX: Works as a   FH: No pertinent family history to this problem    Review of Systems   Constitutional: Negative.  Negative for chills, fever and malaise/fatigue.   Skin:        L wrist laceration   All other systems reviewed and are negative.    Additional details per HPI.      Objective:     /90 (BP Location: Left arm, Patient Position: Sitting, BP Cuff Size: Adult long)   Pulse 61   Temp 36.7 °C (98 °F) (Temporal)   Resp 18   Ht 1.753 m (5' 9\")   Wt 72.1 kg (159 lb)   SpO2 96%   BMI 23.48 kg/m² "     Physical Exam  Vitals reviewed.   Constitutional:       General: He is not in acute distress.     Appearance: He is well-developed. He is not ill-appearing or toxic-appearing.   HENT:      Head: Normocephalic.      Right Ear: External ear normal.      Left Ear: External ear normal.   Eyes:      General: Vision grossly intact.      Extraocular Movements: Extraocular movements intact.      Conjunctiva/sclera: Conjunctivae normal.   Cardiovascular:      Rate and Rhythm: Normal rate.   Pulmonary:      Effort: Pulmonary effort is normal. No respiratory distress.   Musculoskeletal:         General: No deformity. Normal range of motion.      Left wrist: Laceration (Approx 3 cm straight, transverse laceration at volar aspect of left wrist, wound edges well-approximated, 5 sutures remaining; erythema, swelling of wound edges, no discharge) present.      Cervical back: Normal range of motion.   Skin:     General: Skin is warm and dry.      Capillary Refill: Capillary refill takes less than 2 seconds.      Coloration: Skin is not pale.   Neurological:      Mental Status: He is alert and oriented to person, place, and time.      Sensory: No sensory deficit.      Motor: No weakness.      Coordination: Coordination normal.   Psychiatric:         Behavior: Behavior normal. Behavior is cooperative.       Assessment/Plan:     1. Laceration of left wrist, subsequent encounter    2. Wound infection  - cephALEXin (KEFLEX) 500 MG Cap; Take 1 capsule by mouth 4 times a day for 5 days.  Dispense: 20 capsule; Refill: 0    Wound care performed.    Overall, improving. Rx for Keflex. Continue mupirocin ointment. Continue with wound care. Keep laceration clean, dry, covered, and protected. Wear brace as needed to limit ROM of the wrist. Ibuprofen as needed. Brace provided. Continue to monitor for   signs/symptoms of infection. Follow up in 5 days. Return sooner if symptoms change/worsen.    Differential diagnosis, natural history,  supportive care, over-the-counter symptom management per 's instructions, close monitoring, and indications for immediate follow-up discussed.     Patient advised to: Return in about 5 days (around 3/10/2021).    All questions answered. Patient agrees with the plan of care.    Billing note: at least 30 minutes was allotted and spent for face-to-face care with the patient as well as coordination of care such as preparing for the visit, obtaining/reviewing history, reconciling outside information, performing an exam/evaluation, reviewing unique test results/external notes from unique sources, ordering/interpreting diagnostics, ordering/administering treatments, re-evaluating the patient, collaborating/communicating with other healthcare professionals, developing a plan of care, counseling/educating the patient/caregivers/family members, updating the medical record, and ensuring accurate documentation.

## 2021-03-05 NOTE — PROGRESS NOTES
Chief Complaint   Patient presents with   • Follow-Up     SS results, last seen 2/10/210 by Dr Brito for Sleep disorder breathing, nightmares, hypersomnia   • Results     Split night titration done 2/27/21       HPI: This patient is a 40 y.o. male who returns to discuss sleep study results.  Please see Dr. Brito's consultation dated February 10, 2021 for details.  Briefly he has had snoring, witnessed apneas and daytime hypersomnolence noted in addition to parasomnias.  He works as a  doing gravDiaferonard shift.  PSG 02/27/21 showed severe JUAN MANUEL with overall AHI 51/h.  No significant associated oxygen desaturations were noted.  He was titrated on CPAP/BiPAP with improved AHI and sleep quality, however incomplete titration.  He is amenable to CPAP use.      Past Medical History:   Diagnosis Date   • Chickenpox        Social History     Socioeconomic History   • Marital status:      Spouse name: Not on file   • Number of children: Not on file   • Years of education: Not on file   • Highest education level: Not on file   Occupational History   • Not on file   Tobacco Use   • Smoking status: Never Smoker   • Smokeless tobacco: Never Used   Substance and Sexual Activity   • Alcohol use: Yes     Comment: 2-3 a day    • Drug use: Not Currently   • Sexual activity: Not on file   Other Topics Concern   • Not on file   Social History Narrative   • Not on file     Social Determinants of Health     Financial Resource Strain:    • Difficulty of Paying Living Expenses:    Food Insecurity:    • Worried About Running Out of Food in the Last Year:    • Ran Out of Food in the Last Year:    Transportation Needs:    • Lack of Transportation (Medical):    • Lack of Transportation (Non-Medical):    Physical Activity:    • Days of Exercise per Week:    • Minutes of Exercise per Session:    Stress:    • Feeling of Stress :    Social Connections:    • Frequency of Communication with Friends and Family:    • Frequency of Social  "Gatherings with Friends and Family:    • Attends Yarsanism Services:    • Active Member of Clubs or Organizations:    • Attends Club or Organization Meetings:    • Marital Status:    Intimate Partner Violence:    • Fear of Current or Ex-Partner:    • Emotionally Abused:    • Physically Abused:    • Sexually Abused:        Family History   Problem Relation Age of Onset   • Sleep Apnea Neg Hx        Current Outpatient Medications on File Prior to Visit   Medication Sig Dispense Refill   • mupirocin (BACTROBAN) 2 % Ointment Apply to wound BID 7-10 days 30 g 0   • sulfamethoxazole-trimethoprim (BACTRIM DS) 800-160 MG tablet Take 1 tablet by mouth 2 times a day for 5 days. 10 tablet 0   • hydrOXYzine HCl (ATARAX) 25 MG Tab Take one half to 1 tablet by mouth every 8 hours as needed for anxiety for up to 10 days. 30 tablet 0   • omeprazole (PRILOSEC) 40 MG delayed-release capsule Take 40 mg by mouth.       No current facility-administered medications on file prior to visit.       Allergies: Patient has no known allergies.    ROS:   Constitutional: Denies fevers, chills, night sweats, fatigue or weight loss  Eyes: Denies vision loss, pain, drainage, double vision  Ears, Nose, Throat: Denies earache, difficulty hearing, tinnitus, nasal congestion, hoarseness  Cardiovascular: Denies chest pain, tightness, palpitations, orthopnea or edema  Respiratory: Denies shortness of breath, cough, wheezing, hemoptysis  Sleep: As in HPI GI: Denies heartburn, dysphagia, nausea, abdominal pain, diarrhea or constipation  : Denies frequent urination, hematuria, discharge or painful urination  Musculoskeletal: Denies back pain, painful joints, sore muscles  Neurological: Denies weakness or headaches  Skin: No rashes    /80 (BP Location: Right arm, Patient Position: Sitting, BP Cuff Size: Adult)   Pulse 62   Resp 20   Ht 1.753 m (5' 9\")   Wt 71.3 kg (157 lb 1.6 oz)   SpO2 97%     Physical Exam:  Appearance: Well-nourished, " well-developed, in no acute distress  HEENT: Normocephalic, atraumatic, white sclera, PERRLA, masked  Neck: No masses  Respiratory: no intercostal retractions or accessory muscle use  Lungs auscultation:no wheezing  Cardiovascular: No LE edema  Abdomen: Nondistended  Gait: Normal  Digits: No clubbing, cyanosis  Motor: No focal deficits  Orientation: Oriented to time, person and place    Diagnosis:  1. JUAN MANUEL (obstructive sleep apnea)  DME CPAP   2. Shifting sleep-work schedule         Plan:  The patient has severe obstructive sleep apnea, AHI: 51/h associated with nonrestorative sleep and excessive hypersomnolence.  Additionally suffers from parasomnias which can be exacerbated by his sleep disordered breathing.  He works graveyard shift.   Given the severity of JUAN MANUEL recommend CPAP therapy for treatment.  He will start AutoPap: 5-15 cm H20 using a medium Airfit F30 mask.  We will download compliance report after 8 weeks on AutoPap therapy to monitor response to treatment.  We discussed that further in laboratory CPAP titration may be necessitated if he does not respond fully to AutoPap.  Return in about 3 months (around 6/5/2021).

## 2021-03-05 NOTE — LETTER
"   [facility]  [address] [facility] [address] [address]  Phone:  [phone] - Fax:  [phone]   [facility] Progress Report and Disability Certification  Date of Service: 3/5/2021   No Show:  No  Date / Time of Next Visit: 3/10/2021@ 6pm [facility] [[email or web address]]   Claim Information   Patient Name: Mert Carl II  Claim Number:     Employer:   [employer] Date of Injury: 2/23/2021     Insurer / TPA: [employer]  ID / SSN:     Occupation:   Diagnosis: Diagnoses of Laceration of left wrist, subsequent encounter and Wound infection were pertinent to this visit.    Medical Information   Related to Industrial Injury? Yes    Subjective Complaints:  Initial ER visit 2/23/2021 reviewed for continuity of care:    \"Mert Carl II is a 40 y.o. male who presents for evaluation of a laceration on the left wrist.  He was cleaning a glass while at work, he works there as a .  He has some decrease sensation just distal to the laceration but no weakness or numbness of the fingers or hand.  Last tetanus shot was likely greater than 5 years ago.  No medical problems, no other complaints\"    Left wrist laceration repaired with 8 sutures.    Follow-up  visit today 3/5/2021:    This is patient's second follow-up visit.  Patient's last follow-up visit was on 2/28/2021.  Was noted to have redness and pain.  Was started on Bactrim and mupirocin ointment.  Finished Bactrim.  Continues to have some redness, swelling at the laceration.  Denies fever, chills, or red streaking.  No longer sees pus.   Objective Findings: Constitutional:       General: He is not in acute distress.     Appearance: He is well-developed. He is not ill-appearing or toxic-appearing.   Musculoskeletal:         General: No deformity. Normal range of motion.      Left wrist: Laceration (Approx 3 cm straight, transverse laceration at volar aspect of left wrist, wound edges " well-approximated, 5 sutures remaining; erythema, swelling of wound edges, no discharge) present.      Cervical back: Normal range of motion.   Skin:     General: Skin is warm and dry.      Capillary Refill: Capillary refill takes less than 2 seconds.      Coloration: Skin is not pale.   Pre-Existing Condition(s):     Assessment:   Condition Improved    Status: Additional Care Required  Permanent Disability:No    Plan: Medication    Diagnostics:      Comments:  Overall, improving. Rx for Keflex. Continue mupirocin ointment. Continue with wound care. Keep laceration clean, dry, covered, and protected. Wear brace as needed to limit ROM of the wrist. Ibuprofen as needed. Brace provided. Continue to monitor for   signs/symptoms of infection. Follow up in 5 days. Return sooner if symptoms change/worsen.    Disability Information   Status: Released to Full Duty    From:  3/5/2021  Through: 3/10/2021 Restrictions are: Temporary   Physical Restrictions   Sitting:    Standing:    Stooping:    Bending:      Squatting:    Walking:    Climbing:    Pushing:      Pulling:    Other:    Reaching Above Shoulder (L):   Reaching Above Shoulder (R):       Reaching Below Shoulder (L):    Reaching Below Shoulder (R):      Not to exceed Weight Limits   Carrying(hrs):   Weight Limit(lb):   Lifting(hrs):   Weight  Limit(lb):     Comments: Keep laceration clean, dry, covered, and protected. Wear brace as needed to limit ROM of the wrist.    Repetitive Actions   Hands: i.e. Fine Manipulations from Grasping:     Feet: i.e. Operating Foot Controls:     Driving / Operate Machinery:     Provider Name:   JESSIKA GuerreroPMannyRSARAI Physician Signature:  Physician Name:     Clinic Name / Location: [address] [address] Clinic Phone Number: Dept: [phone]   Appointment Time: 2:00 Pm Visit Start Time: 2:21 PM   Check-In Time:  2:08 Pm Visit Discharge Time: 3:02 PM   Original-Treating Physician or  Chiropractor    Page 2-Insurer/TPA    Page 3-Employer    Page 4-Employee

## 2021-03-06 PROCEDURE — RXMED WILLOW AMBULATORY MEDICATION CHARGE: Performed by: NURSE PRACTITIONER

## 2021-03-07 PROCEDURE — 95811 POLYSOM 6/>YRS CPAP 4/> PARM: CPT | Performed by: FAMILY MEDICINE

## 2021-03-08 ENCOUNTER — PHARMACY VISIT (OUTPATIENT)
Dept: PHARMACY | Facility: MEDICAL CENTER | Age: 41
End: 2021-03-08
Payer: COMMERCIAL

## 2021-03-10 ENCOUNTER — OCCUPATIONAL MEDICINE (OUTPATIENT)
Dept: URGENT CARE | Facility: CLINIC | Age: 41
End: 2021-03-10
Payer: COMMERCIAL

## 2021-03-10 VITALS
RESPIRATION RATE: 16 BRPM | SYSTOLIC BLOOD PRESSURE: 128 MMHG | BODY MASS INDEX: 23.7 KG/M2 | HEART RATE: 65 BPM | DIASTOLIC BLOOD PRESSURE: 80 MMHG | HEIGHT: 69 IN | TEMPERATURE: 98.1 F | WEIGHT: 160 LBS | OXYGEN SATURATION: 96 %

## 2021-03-10 DIAGNOSIS — T14.8XXA WOUND INFECTION: ICD-10-CM

## 2021-03-10 DIAGNOSIS — S61.512D LACERATION OF LEFT WRIST, SUBSEQUENT ENCOUNTER: ICD-10-CM

## 2021-03-10 DIAGNOSIS — L08.9 WOUND INFECTION: ICD-10-CM

## 2021-03-10 PROCEDURE — 99213 OFFICE O/P EST LOW 20 MIN: CPT | Performed by: PHYSICIAN ASSISTANT

## 2021-03-10 RX ORDER — CEPHALEXIN 500 MG/1
500 CAPSULE ORAL 4 TIMES DAILY
Qty: 20 CAPSULE | Refills: 0 | Status: SHIPPED | OUTPATIENT
Start: 2021-03-10 | End: 2021-03-15

## 2021-03-10 ASSESSMENT — ENCOUNTER SYMPTOMS
FOCAL WEAKNESS: 0
CHILLS: 0
TINGLING: 0
SENSORY CHANGE: 0
FEVER: 0

## 2021-03-10 ASSESSMENT — FIBROSIS 4 INDEX: FIB4 SCORE: 0.68

## 2021-03-10 NOTE — LETTER
[facility] [address] [address]  Phone:  [phone] - Fax:  [phone]   [facility] Progress Report and Disability Certification  Date of Service: 3/10/2021   No Show:  No  Date / Time of Next Visit: 3/15/2021 @ 1:45PM [facility]   Claim Information   Patient Name: Mert Carl II  Claim Number:     Employer:   [employer] Date of Injury: 2/23/2021     Insurer / TPA: [employer]  ID / SSN:     Occupation:   Diagnosis: Diagnoses of Laceration of left wrist, subsequent encounter and Wound infection were pertinent to this visit.    Medical Information   Related to Industrial Injury? Yes    Subjective Complaints:  DOI: 2/23/2021. 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 He took 2 days of antibiotics but then lost his pills. 5 sutures are still in place. He has been wearing a wrist brace to keep wrist immobilized to facilitate healing. He denies fever, chills, or draining.    Objective Findings: Vitals reviewed.  Left wrist:  Wound on anterior portion of wrist with mild wound dehiscence. Wound bed shallow and healed. No active drainage. Moderate surrounding erythema and edema. FROM of left hand with distal n/v intact.   Pre-Existing Condition(s):     Assessment:   Condition Improved    Status: Additional Care Required  Permanent Disability:No    Plan: Medication  Comments:Renewal of antibiotics. Finish. Wound care. Keep covered and dry. Continue wearing wrist brace. Follow-up in 5 days.    Diagnostics:      Comments:       Disability Information   Status: Released to Restricted Duty    From:  3/10/2021  Through: 3/15/2021 Restrictions are:     Physical Restrictions   Sitting:    Standing:    Stooping:    Bending:      Squatting:    Walking:    Climbing:    Pushing:      Pulling:    Other:    Reaching Above Shoulder (L):   Reaching Above  Shoulder (R):       Reaching Below Shoulder (L):    Reaching Below Shoulder (R):      Not to exceed Weight Limits   Carrying(hrs):   Weight Limit(lb):   Lifting(hrs):   Weight  Limit(lb):     Comments: Keep wound clean,covered and dry. Wear wrist brace at work.    Repetitive Actions   Hands: i.e. Fine Manipulations from Grasping:     Feet: i.e. Operating Foot Controls:     Driving / Operate Machinery:     Provider Name:   Rin Vaca P.A.-C. Physician Signature:  Physician Name:     Clinic Name / Location: [address] [address] Clinic Phone Number: Dept: [phone]   Appointment Time: 6:00 Pm Visit Start Time: 6:58 PM   Check-In Time:  6:00 Pm Visit Discharge Time:  7:40PM   Original-Treating Physician or Chiropractor    Page 2-Insurer/TPA    Page 3-Employer    Page 4-Employee

## 2021-03-11 NOTE — PROGRESS NOTES
"Subjective:      Mert Carl II is a 40 y.o. male who presents with Suture / Staple Removal ( FV (L) wrist )      DOI: 2/23/2021. 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 He took 2 days of antibiotics but then lost his pills. 5 sutures are still in place. He has been wearing a wrist brace to keep wrist immobilized to facilitate healing. He denies fever, chills, or draining.      Suture / Staple Removal        Past Medical History:   Diagnosis Date   • Chickenpox        No past surgical history on file.    Family History   Problem Relation Age of Onset   • Sleep Apnea Neg Hx        No Known Allergies    Medications, Allergies, and current problem list reviewed today in Epic    Review of Systems   Constitutional: Negative for chills and fever.   Musculoskeletal: Negative for joint pain.   Skin:        Laceration to left wrist    Neurological: Negative for tingling, sensory change and focal weakness.   All other systems reviewed and are negative.          Objective:     /80 (BP Location: Left arm, Patient Position: Sitting, BP Cuff Size: Adult long)   Pulse 65   Temp 36.7 °C (98.1 °F) (Temporal)   Resp 16   Ht 1.753 m (5' 9\")   Wt 72.6 kg (160 lb)   SpO2 96%   BMI 23.63 kg/m²      Physical Exam  Constitutional:       General: He is not in acute distress.  HENT:      Head: Normocephalic.   Eyes:      Conjunctiva/sclera: Conjunctivae normal.   Cardiovascular:      Rate and Rhythm: Normal rate.   Pulmonary:      Effort: Pulmonary effort is normal. No respiratory distress.   Skin:     General: Skin is warm and dry.   Neurological:      General: No focal deficit present.      Mental Status: He is alert and oriented to person, place, and time.   Psychiatric:         Mood and Affect: Mood normal.         Behavior: Behavior normal.         Thought Content: Thought content normal.         Judgment: Judgment normal. "         Vitals reviewed.  Left wrist:  Wound on anterior portion of wrist with mild wound dehiscence. Wound bed shallow and healed. No active drainage. Moderate surrounding erythema and edema. FROM of left hand with distal n/v intact.       Assessment/Plan:        1. Laceration of left wrist, subsequent encounter   5 sutures removed   - cephALEXin (KEFLEX) 500 MG Cap; Take 1 capsule by mouth 4 times a day for 5 days.  Dispense: 20 capsule; Refill: 0    2. Wound infection  See above    Keep wound clean, covered and dry  Continue wrist brace.   RTC in 5 days  Finish antibiotics.  Restrictions per D-39    Differential diagnoses, Supportive care, and indications for immediate follow-up discussed with patient.   Pathogenesis of diagnosis discussed including typical length and natural progression.   Instructed to return to clinic or nearest emergency department for any change in condition, further concerns, or worsening of symptoms.    The patient demonstrated a good understanding and agreed with the treatment plan.    Rin Vaca P.A.-C.

## 2021-04-09 ENCOUNTER — HOSPITAL ENCOUNTER (EMERGENCY)
Facility: MEDICAL CENTER | Age: 41
End: 2021-04-09
Attending: EMERGENCY MEDICINE
Payer: COMMERCIAL

## 2021-04-09 VITALS
WEIGHT: 160 LBS | BODY MASS INDEX: 23.7 KG/M2 | RESPIRATION RATE: 14 BRPM | DIASTOLIC BLOOD PRESSURE: 98 MMHG | SYSTOLIC BLOOD PRESSURE: 165 MMHG | HEIGHT: 69 IN | HEART RATE: 71 BPM | TEMPERATURE: 97.5 F | OXYGEN SATURATION: 98 %
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
## 2021-04-09 PROCEDURE — 99284 EMERGENCY DEPT VISIT MOD MDM: CPT

## 2021-04-09 ASSESSMENT — FIBROSIS 4 INDEX: FIB4 SCORE: 0.68
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
## 2021-04-09 NOTE — ED NOTES
Pt given discharge instructions/home care instructions explained, pt verbalized understanding of instructions given, pt ambulatory in PD custody.

## 2021-04-21 ENCOUNTER — IMMUNIZATION (OUTPATIENT)
Dept: FAMILY PLANNING/WOMEN'S HEALTH CLINIC | Facility: IMMUNIZATION CENTER | Age: 41
End: 2021-04-21
Payer: COMMERCIAL

## 2021-04-21 DIAGNOSIS — Z23 ENCOUNTER FOR VACCINATION: Primary | ICD-10-CM

## 2021-04-21 PROCEDURE — 91300 PFIZER SARS-COV-2 VACCINE: CPT | Performed by: INTERNAL MEDICINE

## 2021-04-21 PROCEDURE — 0001A PFIZER SARS-COV-2 VACCINE: CPT | Performed by: INTERNAL MEDICINE

## 2021-04-22 ENCOUNTER — PHARMACY VISIT (OUTPATIENT)
Dept: PHARMACY | Facility: MEDICAL CENTER | Age: 41
End: 2021-04-22
Payer: COMMERCIAL

## 2021-04-22 PROCEDURE — RXMED WILLOW AMBULATORY MEDICATION CHARGE: Performed by: FAMILY MEDICINE

## 2021-04-22 RX ORDER — ESCITALOPRAM OXALATE 10 MG/1
TABLET ORAL
Qty: 30 TABLET | Refills: 2 | Status: SHIPPED | OUTPATIENT
Start: 2021-04-22 | End: 2021-06-10

## 2021-05-13 ENCOUNTER — IMMUNIZATION (OUTPATIENT)
Dept: FAMILY PLANNING/WOMEN'S HEALTH CLINIC | Facility: IMMUNIZATION CENTER | Age: 41
End: 2021-05-13
Payer: COMMERCIAL

## 2021-05-13 DIAGNOSIS — Z23 ENCOUNTER FOR VACCINATION: Primary | ICD-10-CM

## 2021-05-13 PROCEDURE — 0002A PFIZER SARS-COV-2 VACCINE: CPT

## 2021-05-13 PROCEDURE — 91300 PFIZER SARS-COV-2 VACCINE: CPT

## 2021-05-13 PROCEDURE — RXMED WILLOW AMBULATORY MEDICATION CHARGE: Performed by: FAMILY MEDICINE

## 2021-05-15 ENCOUNTER — PHARMACY VISIT (OUTPATIENT)
Dept: PHARMACY | Facility: MEDICAL CENTER | Age: 41
End: 2021-05-15
Payer: COMMERCIAL

## 2021-05-31 ENCOUNTER — HOSPITAL ENCOUNTER (OUTPATIENT)
Dept: LAB | Facility: MEDICAL CENTER | Age: 41
End: 2021-05-31
Attending: FAMILY MEDICINE
Payer: COMMERCIAL

## 2021-05-31 LAB
ERYTHROCYTE [DISTWIDTH] IN BLOOD BY AUTOMATED COUNT: 36.1 FL (ref 35.9–50)
HCT VFR BLD AUTO: 40.4 % (ref 42–52)
HGB BLD-MCNC: 13.6 G/DL (ref 14–18)
MCH RBC QN AUTO: 29.8 PG (ref 27–33)
MCHC RBC AUTO-ENTMCNC: 33.7 G/DL (ref 33.7–35.3)
MCV RBC AUTO: 88.6 FL (ref 81.4–97.8)
PLATELET # BLD AUTO: 207 K/UL (ref 164–446)
PMV BLD AUTO: 10.9 FL (ref 9–12.9)
RBC # BLD AUTO: 4.56 M/UL (ref 4.7–6.1)
WBC # BLD AUTO: 6.9 K/UL (ref 4.8–10.8)

## 2021-05-31 PROCEDURE — 80500 HCHG CLINICAL PATH CONSULT-LIMITED: CPT

## 2021-05-31 PROCEDURE — 36415 COLL VENOUS BLD VENIPUNCTURE: CPT

## 2021-05-31 PROCEDURE — 85027 COMPLETE CBC AUTOMATED: CPT

## 2021-06-01 LAB
PATH REV: NORMAL
PATH REV: NORMAL

## 2021-06-02 PROCEDURE — RXMED WILLOW AMBULATORY MEDICATION CHARGE: Performed by: FAMILY MEDICINE

## 2021-06-05 ENCOUNTER — PHARMACY VISIT (OUTPATIENT)
Dept: PHARMACY | Facility: MEDICAL CENTER | Age: 41
End: 2021-06-05
Payer: COMMERCIAL

## 2021-06-10 ENCOUNTER — OFFICE VISIT (OUTPATIENT)
Dept: SLEEP MEDICINE | Facility: MEDICAL CENTER | Age: 41
End: 2021-06-10
Payer: COMMERCIAL

## 2021-06-10 ENCOUNTER — TELEPHONE (OUTPATIENT)
Dept: SLEEP MEDICINE | Facility: MEDICAL CENTER | Age: 41
End: 2021-06-10

## 2021-06-10 VITALS
WEIGHT: 156 LBS | OXYGEN SATURATION: 99 % | SYSTOLIC BLOOD PRESSURE: 118 MMHG | DIASTOLIC BLOOD PRESSURE: 72 MMHG | BODY MASS INDEX: 23.11 KG/M2 | HEIGHT: 69 IN | HEART RATE: 80 BPM | RESPIRATION RATE: 16 BRPM

## 2021-06-10 DIAGNOSIS — G47.33 OBSTRUCTIVE SLEEP APNEA: ICD-10-CM

## 2021-06-10 DIAGNOSIS — Z78.9 NONSMOKER: ICD-10-CM

## 2021-06-10 PROCEDURE — 99214 OFFICE O/P EST MOD 30 MIN: CPT | Performed by: NURSE PRACTITIONER

## 2021-06-10 ASSESSMENT — FIBROSIS 4 INDEX: FIB4 SCORE: 0.85

## 2021-06-10 NOTE — PROGRESS NOTES
Chief Complaint   Patient presents with   • Apnea     last seen 3/5/2021        HPI:  Mert Carl II is a 40 y.o. year old male here today for follow-up on JUAN MANUEL; first compliance check.  Last OV 3/5/21 with Dr. Cortez    Currently using APAP 5-15cm  Compliance report 5/11/2021 through 6/9/2021 notes 100% compliance, average nightly 6 hours 30 minutes, mean pressure 8.4 cm, minimal to moderate mask leak with an overall AHI of 10.5/h.  Only one night of significant mask leak noted on 30-day report.  Average hypopnea index of 5.5 with average MICKIE index 3.4.  Reviewed with patient and his wife.  He is amenable to pressure adjustment.  He tolerates mask and pressure well; using full facemask.  He denies morning headaches.  His wife notes it easier to wake him in the mornings and not falling asleep accidentally or napping as frequently.  He will occasionally doze off in the evening if he is watching TV after she goes to bed.  He only naps about 2 times per week for 30 minutes on average which is significantly improved prior therapy symptoms.  He feels he has a better quality rest and overall happy with therapy.  He denies cardiac or respiratory symptoms today.  No significant changes in health since last office visit.    SLEEP HX:  PSG split night 2/27/21 noted sleep efficiency of 50%, overall AHI 51.4/h and REM AHI 52.71/h with supine AHI 57.01/h.  39% events were central.  PLMS index of 23.1/h.  Mean SPO2 97% with O2 mikal of 89%.  Patient was titrated on CPAP and BiPAP with best tolerated pressure BiPAP 18/13 cm.  AHI improved to 3.53/h and O2 mikal of 95%.  Treatment emergent central apneas noted.  Overall incomplete titration study.      ROS: As per HPI and otherwise negative if not stated.    Past Medical History:   Diagnosis Date   • Chickenpox        History reviewed. No pertinent surgical history.    Family History   Problem Relation Age of Onset   • Sleep Apnea Neg Hx        Social History  "    Socioeconomic History   • Marital status:      Spouse name: Not on file   • Number of children: Not on file   • Years of education: Not on file   • Highest education level: Not on file   Occupational History   • Not on file   Tobacco Use   • Smoking status: Never Smoker   • Smokeless tobacco: Never Used   Vaping Use   • Vaping Use: Every day   • Substances: Nicotine   Substance and Sexual Activity   • Alcohol use: Yes     Comment: 2-3 a day    • Drug use: Not Currently   • Sexual activity: Not on file   Other Topics Concern   • Not on file   Social History Narrative   • Not on file     Social Determinants of Health     Financial Resource Strain:    • Difficulty of Paying Living Expenses:    Food Insecurity:    • Worried About Running Out of Food in the Last Year:    • Ran Out of Food in the Last Year:    Transportation Needs:    • Lack of Transportation (Medical):    • Lack of Transportation (Non-Medical):    Physical Activity:    • Days of Exercise per Week:    • Minutes of Exercise per Session:    Stress:    • Feeling of Stress :    Social Connections:    • Frequency of Communication with Friends and Family:    • Frequency of Social Gatherings with Friends and Family:    • Attends Yarsanism Services:    • Active Member of Clubs or Organizations:    • Attends Club or Organization Meetings:    • Marital Status:    Intimate Partner Violence:    • Fear of Current or Ex-Partner:    • Emotionally Abused:    • Physically Abused:    • Sexually Abused:        Allergies as of 06/10/2021   • (No Known Allergies)        Vitals:  /72 (BP Location: Left arm, Patient Position: Sitting, BP Cuff Size: Adult)   Pulse 80   Resp 16   Ht 1.753 m (5' 9\")   Wt 70.8 kg (156 lb)   SpO2 99%     Current medications as of today   Current Outpatient Medications   Medication Sig Dispense Refill   • ZINC GLUCONATE PO Take  by mouth.     • omeprazole (PRILOSEC) 40 MG delayed-release capsule Take 1 capsule (40 mg) by mouth " every morning before breakfast. 90 capsule 3   • escitalopram (LEXAPRO) 20 MG tablet Take 1 tablet by mouth daily. 60 tablet 1     No current facility-administered medications for this visit.         Physical Exam:   Gen:           Alert and oriented, No apparent distress. Mood and affect appropriate, normal interaction with examiner.  Eyes:          PERRL, EOM intact, sclere white, conjunctive moist.  Ears:          Not examined.   Hearing:     Grossly intact.  Nose:          Normal, no lesions or deformities.  Dentition:    mask  Oropharynx:   mask  Mallampati Classification: mask  Neck:        Supple, trachea midline, no masses.  Respiratory Effort: No intercostal retractions or use of accessory muscles.   Lung Auscultation:      Clear to auscultation bilaterally; no rales, rhonchi or wheezing.  CV:            Regular rate and rhythm. No murmurs, rubs or gallops.  Abd:           Not examined.   Lymphadenopathy: Not examined.  Gait and Station: Normal.  Digits and Nails: No clubbing, cyanosis, petechiae, or nodes.   Cranial Nerves: II-XII grossly intact.  Skin:        No rashes, lesions or ulcers noted.               Ext:           No cyanosis or edema.      Assessment:  1. Obstructive sleep apnea     2. BMI 23.0-23.9, adult     3. Nonsmoker         Immunizations:    Flu:recommend in fall  Pneumovax 23:not due  Prevnar 13:not due  COVID-19: 5/13/21, 4/21/21    Plan:  1.  Patient is responding well to sleep apnea therapy.  Reviewed prior sleep testing and titration with patient and his wife.  Continue CPAP nightly.  We will adjust pressure to a fixed pressure of 11 cm to see if this further reduces AHI and noted increased hypopneas and central apneas on detailed compliance report.  We will check compliance in 2 weeks to confirm this.    Buys patient call DME for more supplies.  2.  Discussed sleep hygiene.  3.  Follow-up primary care for the health concerns.  4.  Follow-up in 3 months for second compliance check,  sooner if needed.    Please note that this dictation was created using voice recognition software. I have made every reasonable attempt to correct obvious errors, but it is possible there are errors of grammar and possibly content that I did not discover before finalizing the note.

## 2021-06-21 NOTE — TELEPHONE ENCOUNTER
Compliance report for the last 12 days notes 100% compliance, average nightly use 5 hours 11 minutes, mean pressure 7.8 cm, minimal mask leak with an overall AHI of 5.9/h. This is improved.  Prior compliance noted AHI of 10.5/h.  Continue with CPAP 11 cm.

## 2021-06-25 ENCOUNTER — HOSPITAL ENCOUNTER (OUTPATIENT)
Dept: LAB | Facility: MEDICAL CENTER | Age: 41
End: 2021-06-25
Attending: FAMILY MEDICINE
Payer: COMMERCIAL

## 2021-06-25 LAB
BASOPHILS # BLD AUTO: 0.5 % (ref 0–1.8)
BASOPHILS # BLD: 0.03 K/UL (ref 0–0.12)
EOSINOPHIL # BLD AUTO: 0.08 K/UL (ref 0–0.51)
EOSINOPHIL NFR BLD: 1.4 % (ref 0–6.9)
ERYTHROCYTE [DISTWIDTH] IN BLOOD BY AUTOMATED COUNT: 38.4 FL (ref 35.9–50)
FERRITIN SERPL-MCNC: 98.4 NG/ML (ref 22–322)
HCT VFR BLD AUTO: 38.8 % (ref 42–52)
HCV AB SER QL: NORMAL
HGB BLD-MCNC: 12.8 G/DL (ref 14–18)
HGB RETIC QN AUTO: 33.8 PG/CELL (ref 29–35)
HIV 1+2 AB+HIV1 P24 AG SERPL QL IA: NORMAL
IMM GRANULOCYTES # BLD AUTO: 0.01 K/UL (ref 0–0.11)
IMM GRANULOCYTES NFR BLD AUTO: 0.2 % (ref 0–0.9)
IMM RETICS NFR: 4.2 % (ref 9.3–17.4)
IRON SATN MFR SERPL: 40 % (ref 15–55)
IRON SERPL-MCNC: 101 UG/DL (ref 50–180)
LDH SERPL L TO P-CCNC: 155 U/L (ref 107–266)
LYMPHOCYTES # BLD AUTO: 1.37 K/UL (ref 1–4.8)
LYMPHOCYTES NFR BLD: 24.7 % (ref 22–41)
MCH RBC QN AUTO: 28.9 PG (ref 27–33)
MCHC RBC AUTO-ENTMCNC: 33 G/DL (ref 33.7–35.3)
MCV RBC AUTO: 87.6 FL (ref 81.4–97.8)
MONOCYTES # BLD AUTO: 0.67 K/UL (ref 0–0.85)
MONOCYTES NFR BLD AUTO: 12.1 % (ref 0–13.4)
NEUTROPHILS # BLD AUTO: 3.38 K/UL (ref 1.82–7.42)
NEUTROPHILS NFR BLD: 61.1 % (ref 44–72)
NRBC # BLD AUTO: 0 K/UL
NRBC BLD-RTO: 0 /100 WBC
PLATELET # BLD AUTO: 189 K/UL (ref 164–446)
PMV BLD AUTO: 11.4 FL (ref 9–12.9)
RBC # BLD AUTO: 4.43 M/UL (ref 4.7–6.1)
RETICS # AUTO: 0.04 M/UL (ref 0.04–0.06)
RETICS/RBC NFR: 0.8 % (ref 0.8–2.1)
TIBC SERPL-MCNC: 255 UG/DL (ref 250–450)
TRANSFERRIN SERPL-MCNC: 226 MG/DL (ref 200–370)
UIBC SERPL-MCNC: 154 UG/DL (ref 110–370)
WBC # BLD AUTO: 5.5 K/UL (ref 4.8–10.8)

## 2021-06-25 PROCEDURE — 82728 ASSAY OF FERRITIN: CPT

## 2021-06-25 PROCEDURE — 85046 RETICYTE/HGB CONCENTRATE: CPT

## 2021-06-25 PROCEDURE — 83010 ASSAY OF HAPTOGLOBIN QUANT: CPT

## 2021-06-25 PROCEDURE — 83615 LACTATE (LD) (LDH) ENZYME: CPT

## 2021-06-25 PROCEDURE — 36415 COLL VENOUS BLD VENIPUNCTURE: CPT

## 2021-06-25 PROCEDURE — 87389 HIV-1 AG W/HIV-1&-2 AB AG IA: CPT

## 2021-06-25 PROCEDURE — 83540 ASSAY OF IRON: CPT

## 2021-06-25 PROCEDURE — 85025 COMPLETE CBC W/AUTO DIFF WBC: CPT

## 2021-06-25 PROCEDURE — 84466 ASSAY OF TRANSFERRIN: CPT

## 2021-06-25 PROCEDURE — 83550 IRON BINDING TEST: CPT

## 2021-06-25 PROCEDURE — 86803 HEPATITIS C AB TEST: CPT

## 2021-06-25 PROCEDURE — 87522 HEPATITIS C REVRS TRNSCRPJ: CPT

## 2021-06-29 LAB
HCV RNA SERPL NAA+PROBE-ACNC: NOT DETECTED IU/ML
HCV RNA SERPL NAA+PROBE-LOG IU: NOT DETECTED LOG IU/ML
HCV RNA SERPL QL NAA+PROBE: NOT DETECTED

## 2021-06-30 LAB — HAPTOGLOB SERPL-MCNC: 140 MG/DL (ref 30–200)

## 2021-07-01 ENCOUNTER — HOSPITAL ENCOUNTER (OUTPATIENT)
Facility: MEDICAL CENTER | Age: 41
End: 2021-07-01
Attending: FAMILY MEDICINE
Payer: COMMERCIAL

## 2021-07-01 PROCEDURE — 82274 ASSAY TEST FOR BLOOD FECAL: CPT

## 2021-07-05 LAB — IMM ASSAY OCC BLD FITOB: NEGATIVE

## 2021-07-10 PROCEDURE — RXMED WILLOW AMBULATORY MEDICATION CHARGE: Performed by: FAMILY MEDICINE

## 2021-07-15 ENCOUNTER — PHARMACY VISIT (OUTPATIENT)
Dept: PHARMACY | Facility: MEDICAL CENTER | Age: 41
End: 2021-07-15
Payer: COMMERCIAL

## 2021-08-18 ENCOUNTER — HOSPITAL ENCOUNTER (OUTPATIENT)
Dept: LAB | Facility: MEDICAL CENTER | Age: 41
End: 2021-08-18
Attending: PHYSICIAN ASSISTANT
Payer: COMMERCIAL

## 2021-08-18 LAB
FSH SERPL-ACNC: 11.5 MIU/ML (ref 1.5–12.4)
LH SERPL-ACNC: 9.7 IU/L (ref 1.7–8.6)
PROLACTIN SERPL-MCNC: 18.9 NG/ML (ref 2.1–17.7)
TESTOST SERPL-MCNC: 299 NG/DL (ref 175–781)

## 2021-08-18 PROCEDURE — 84403 ASSAY OF TOTAL TESTOSTERONE: CPT

## 2021-08-18 PROCEDURE — 369999 HCHG MISC LAB CHARGE

## 2021-08-18 PROCEDURE — 36415 COLL VENOUS BLD VENIPUNCTURE: CPT

## 2021-08-18 PROCEDURE — 89325 SPERM ANTIBODY TEST: CPT | Mod: 91

## 2021-08-18 PROCEDURE — 83002 ASSAY OF GONADOTROPIN (LH): CPT

## 2021-08-18 PROCEDURE — 89325 SPERM ANTIBODY TEST: CPT

## 2021-08-18 PROCEDURE — 84146 ASSAY OF PROLACTIN: CPT

## 2021-08-18 PROCEDURE — 83001 ASSAY OF GONADOTROPIN (FSH): CPT

## 2021-08-18 PROCEDURE — 369999 HCHG MISC LAB CHARGE: Mod: 91

## 2021-08-26 LAB — TEST NAME 95000: NORMAL

## 2021-09-07 PROCEDURE — RXMED WILLOW AMBULATORY MEDICATION CHARGE: Performed by: FAMILY MEDICINE

## 2021-09-08 ENCOUNTER — PHARMACY VISIT (OUTPATIENT)
Dept: PHARMACY | Facility: MEDICAL CENTER | Age: 41
End: 2021-09-08
Payer: COMMERCIAL

## 2021-09-16 ENCOUNTER — SLEEP CENTER VISIT (OUTPATIENT)
Dept: SLEEP MEDICINE | Facility: MEDICAL CENTER | Age: 41
End: 2021-09-16
Payer: COMMERCIAL

## 2021-09-16 VITALS
WEIGHT: 161 LBS | SYSTOLIC BLOOD PRESSURE: 120 MMHG | HEART RATE: 72 BPM | BODY MASS INDEX: 23.85 KG/M2 | RESPIRATION RATE: 16 BRPM | DIASTOLIC BLOOD PRESSURE: 68 MMHG | OXYGEN SATURATION: 97 % | HEIGHT: 69 IN

## 2021-09-16 DIAGNOSIS — G47.33 OBSTRUCTIVE SLEEP APNEA: ICD-10-CM

## 2021-09-16 DIAGNOSIS — Z78.9 NONSMOKER: ICD-10-CM

## 2021-09-16 PROBLEM — Q55.0 ABSENCE OF TESTICLE IN SCROTUM: Status: ACTIVE | Noted: 2021-08-16

## 2021-09-16 PROBLEM — F41.9 ANXIETY: Status: ACTIVE | Noted: 2021-09-16

## 2021-09-16 PROBLEM — N46.9 MALE INFERTILITY: Status: ACTIVE | Noted: 2021-08-16

## 2021-09-16 PROBLEM — E22.1 HYPERPROLACTINEMIA (HCC): Status: ACTIVE | Noted: 2021-09-01

## 2021-09-16 PROCEDURE — 99213 OFFICE O/P EST LOW 20 MIN: CPT | Performed by: NURSE PRACTITIONER

## 2021-09-16 RX ORDER — CETIRIZINE HYDROCHLORIDE 10 MG/1
10 TABLET ORAL DAILY
COMMUNITY
End: 2022-04-22

## 2021-09-16 ASSESSMENT — FIBROSIS 4 INDEX: FIB4 SCORE: 0.93

## 2021-09-16 NOTE — PROGRESS NOTES
Chief Complaint   Patient presents with   • Apnea     last seen 6/10/2021        HPI:  Mert Carl II is a 40 y.o. year old male here today for follow-up on JUAN MANUEL.  Last OV 6/10/21     Currently using CPAP @ 11cm H20 nightly; RESPIRONICS; device obtained 2021.  Recall discussed with patient letter provided.  He will go ahead and register his device.  He did obtain an inline bacteria filter for continued use.  He denies any black debris in his tubing or mask and no acute respiratory symptoms.  Since last office visit his pressures were empirically adjusted to CPAP 11 cm.  Compliance report 8/17/2021 through 9/15/2021 indicates 100% compliance, average nightly use 5 hours 47 minutes, minimal mask leak of 5 minutes with reduced aHI of 3.7/h.  Reviewed finds with patient.  He feels he sleeps well on therapy.  He denies morning headaches.  He notes improved energy levels through the day no significant sleepiness or napping as prior to starting therapy.  He notes coming home after his shift and sometimes falling asleep on the couch and not going to bed immediately and may not get as many hours and on his device.  He is trying to set an alarm in his phone to stop doing this.  We reviewed sleep hygiene.  He denies cardiac or respiratory symptoms today.  No significant changes in health since last office visit.     SLEEP HX:  PSG split night 2/27/21 noted sleep efficiency of 50%, overall AHI 51.4/h and REM AHI 52.71/h with supine AHI 57.01/h.  39% events were central.  PLMS index of 23.1/h.  Mean SPO2 97% with O2 mikal of 89%.  Patient was titrated on CPAP and BiPAP with best tolerated pressure BiPAP 18/13 cm.  AHI improved to 3.53/h and O2 mikal of 95%.  Treatment emergent central apneas noted.  Overall incomplete titration study.    ROS: As per HPI and otherwise negative if not stated.    Past Medical History:   Diagnosis Date   • Chickenpox        History reviewed. No pertinent surgical history.    Family History  "  Problem Relation Age of Onset   • Sleep Apnea Neg Hx        Social History     Socioeconomic History   • Marital status:      Spouse name: Not on file   • Number of children: Not on file   • Years of education: Not on file   • Highest education level: Not on file   Occupational History   • Not on file   Tobacco Use   • Smoking status: Never Smoker   • Smokeless tobacco: Never Used   Vaping Use   • Vaping Use: Every day   • Substances: Nicotine   Substance and Sexual Activity   • Alcohol use: Yes     Comment: 2-3 a day    • Drug use: Not Currently   • Sexual activity: Not on file   Other Topics Concern   • Not on file   Social History Narrative   • Not on file     Social Determinants of Health     Financial Resource Strain:    • Difficulty of Paying Living Expenses:    Food Insecurity:    • Worried About Running Out of Food in the Last Year:    • Ran Out of Food in the Last Year:    Transportation Needs:    • Lack of Transportation (Medical):    • Lack of Transportation (Non-Medical):    Physical Activity:    • Days of Exercise per Week:    • Minutes of Exercise per Session:    Stress:    • Feeling of Stress :    Social Connections:    • Frequency of Communication with Friends and Family:    • Frequency of Social Gatherings with Friends and Family:    • Attends Synagogue Services:    • Active Member of Clubs or Organizations:    • Attends Club or Organization Meetings:    • Marital Status:    Intimate Partner Violence:    • Fear of Current or Ex-Partner:    • Emotionally Abused:    • Physically Abused:    • Sexually Abused:        Allergies as of 09/16/2021   • (No Known Allergies)        Vitals:  /68 (BP Location: Left arm, Patient Position: Sitting, BP Cuff Size: Adult)   Pulse 72   Resp 16   Ht 1.753 m (5' 9\")   Wt 73 kg (161 lb)   SpO2 97%     Current medications as of today   Current Outpatient Medications   Medication Sig Dispense Refill   • escitalopram (LEXAPRO) 20 MG tablet Take 1 tablet " by mouth daily. 60 Tablet 1   • ZINC GLUCONATE PO Take  by mouth.     • omeprazole (PRILOSEC) 40 MG delayed-release capsule Take 1 capsule (40 mg) by mouth every morning before breakfast. 90 capsule 3     No current facility-administered medications for this visit.         Physical Exam:   Gen:           Alert and oriented, No apparent distress. Mood and affect appropriate, normal interaction with examiner.  Eyes:          PERRL, EOM intact, sclere white, conjunctive moist.  Ears:          Not examined.   Hearing:     Grossly intact.  Nose:          Normal, no lesions or deformities.  Dentition:    mask  Oropharynx:   mask  Mallampati Classification: mask  Neck:        Supple, trachea midline, no masses.  Respiratory Effort: No intercostal retractions or use of accessory muscles.   Lung Auscultation:      Clear to auscultation bilaterally; no rales, rhonchi or wheezing.  CV:            Regular rate and rhythm. No murmurs, rubs or gallops.  Abd:           Not examined.   Lymphadenopathy: Not examined.  Gait and Station: Normal.  Digits and Nails: No clubbing, cyanosis, petechiae, or nodes.   Cranial Nerves: II-XII grossly intact.  Skin:        No rashes, lesions or ulcers noted.               Ext:           No cyanosis or edema.      Assessment:  1. Obstructive sleep apnea  DME Mask and Supplies   2. BMI 23.0-23.9, adult     3. Nonsmoker         Immunizations:    Flu:recommend  Pneumovax 23:not due  Prevnar 13:not due  COVID-19: 5/13/21, 4/21/21    Plan:  1.  Patient sleep apnea significantly improved.  He will continue CPAP 11 cm nightly.  DME mask/supplies  Encouraged him to call for an appointment for mask fit if he feels his straps continue to rub on his ears.  2.  Discussed sleep hygiene  3.  For primary care further health concerns  4.  Follow-up in 1 year for compliance check, sooner if needed.    Please note that this dictation was created using voice recognition software. I have made every reasonable attempt  to correct obvious errors, but it is possible there are errors of grammar and possibly content that I did not discover before finalizing the note.

## 2021-09-18 ENCOUNTER — HOSPITAL ENCOUNTER (OUTPATIENT)
Dept: LAB | Facility: MEDICAL CENTER | Age: 41
End: 2021-09-18
Attending: PHYSICIAN ASSISTANT
Payer: COMMERCIAL

## 2021-09-18 LAB
FSH SERPL-ACNC: 9.8 MIU/ML (ref 1.5–12.4)
LH SERPL-ACNC: 11.1 IU/L (ref 1.7–8.6)
PROLACTIN SERPL-MCNC: 22.6 NG/ML (ref 2.1–17.7)
TESTOST SERPL-MCNC: 300 NG/DL (ref 175–781)

## 2021-09-18 PROCEDURE — 83002 ASSAY OF GONADOTROPIN (LH): CPT

## 2021-09-18 PROCEDURE — 369999 HCHG MISC LAB CHARGE: Mod: 91

## 2021-09-18 PROCEDURE — 84146 ASSAY OF PROLACTIN: CPT

## 2021-09-18 PROCEDURE — 84403 ASSAY OF TOTAL TESTOSTERONE: CPT

## 2021-09-18 PROCEDURE — 89325 SPERM ANTIBODY TEST: CPT

## 2021-09-18 PROCEDURE — 369999 HCHG MISC LAB CHARGE

## 2021-09-18 PROCEDURE — 36415 COLL VENOUS BLD VENIPUNCTURE: CPT

## 2021-09-18 PROCEDURE — 83001 ASSAY OF GONADOTROPIN (FSH): CPT

## 2021-09-18 PROCEDURE — 89325 SPERM ANTIBODY TEST: CPT | Mod: 91

## 2021-10-02 LAB — TEST NAME 95000: NORMAL

## 2021-11-05 ENCOUNTER — HOSPITAL ENCOUNTER (OUTPATIENT)
Dept: RADIOLOGY | Facility: MEDICAL CENTER | Age: 41
End: 2021-11-05
Attending: PHYSICIAN ASSISTANT
Payer: COMMERCIAL

## 2021-11-05 DIAGNOSIS — E22.1 HYPERPROLACTINEMIA (HCC): ICD-10-CM

## 2021-11-05 PROCEDURE — A9576 INJ PROHANCE MULTIPACK: HCPCS | Performed by: PHYSICIAN ASSISTANT

## 2021-11-05 PROCEDURE — 700117 HCHG RX CONTRAST REV CODE 255: Performed by: PHYSICIAN ASSISTANT

## 2021-11-05 PROCEDURE — 70553 MRI BRAIN STEM W/O & W/DYE: CPT

## 2021-11-05 RX ADMIN — GADOTERIDOL 15 ML: 279.3 INJECTION, SOLUTION INTRAVENOUS at 13:46

## 2021-11-29 PROCEDURE — RXMED WILLOW AMBULATORY MEDICATION CHARGE: Performed by: FAMILY MEDICINE

## 2021-12-01 ENCOUNTER — PHARMACY VISIT (OUTPATIENT)
Dept: PHARMACY | Facility: MEDICAL CENTER | Age: 41
End: 2021-12-01
Payer: COMMERCIAL

## 2022-01-07 ENCOUNTER — PHARMACY VISIT (OUTPATIENT)
Dept: PHARMACY | Facility: MEDICAL CENTER | Age: 42
End: 2022-01-07
Payer: COMMERCIAL

## 2022-01-07 PROCEDURE — RXMED WILLOW AMBULATORY MEDICATION CHARGE: Performed by: FAMILY MEDICINE

## 2022-01-27 PROCEDURE — RXMED WILLOW AMBULATORY MEDICATION CHARGE: Performed by: OBSTETRICS & GYNECOLOGY

## 2022-01-28 ENCOUNTER — TELEPHONE (OUTPATIENT)
Dept: ENDOCRINOLOGY | Facility: MEDICAL CENTER | Age: 42
End: 2022-01-28

## 2022-01-28 NOTE — TELEPHONE ENCOUNTER
VOICEMAIL  1. Caller Name: Mert Carl II                        Call Back Number: [phone] (home)      2. Message: Patient called and left a message to reschedule his appointment.  I have called him and let him know that the provider is booked and we are unable to move the appointment unless he would like to move to June. He understood and would keep appointment.     3. Patient approves office to leave a detailed voicemail/MyChart message: yes

## 2022-01-31 ENCOUNTER — PHARMACY VISIT (OUTPATIENT)
Dept: PHARMACY | Facility: MEDICAL CENTER | Age: 42
End: 2022-01-31
Payer: COMMERCIAL

## 2022-01-31 ENCOUNTER — HOSPITAL ENCOUNTER (OUTPATIENT)
Dept: LAB | Facility: MEDICAL CENTER | Age: 42
End: 2022-01-31
Attending: OBSTETRICS & GYNECOLOGY
Payer: COMMERCIAL

## 2022-01-31 PROCEDURE — 88262 CHROMOSOME ANALYSIS 15-20: CPT

## 2022-01-31 PROCEDURE — 81403 MOPATH PROCEDURE LEVEL 4: CPT

## 2022-01-31 PROCEDURE — 36415 COLL VENOUS BLD VENIPUNCTURE: CPT

## 2022-01-31 PROCEDURE — 88230 TISSUE CULTURE LYMPHOCYTE: CPT

## 2022-02-01 PROCEDURE — RXMED WILLOW AMBULATORY MEDICATION CHARGE: Performed by: FAMILY MEDICINE

## 2022-02-04 ENCOUNTER — PHARMACY VISIT (OUTPATIENT)
Dept: PHARMACY | Facility: MEDICAL CENTER | Age: 42
End: 2022-02-04
Payer: COMMERCIAL

## 2022-02-08 LAB
KARYOTYP BLD/T: NORMAL
PATHOLOGY STUDY: NORMAL

## 2022-02-15 LAB — TEST NAME 95000: NORMAL

## 2022-03-03 ENCOUNTER — PHARMACY VISIT (OUTPATIENT)
Dept: PHARMACY | Facility: MEDICAL CENTER | Age: 42
End: 2022-03-03
Payer: COMMERCIAL

## 2022-03-03 PROCEDURE — RXMED WILLOW AMBULATORY MEDICATION CHARGE: Performed by: FAMILY MEDICINE

## 2022-03-15 ENCOUNTER — HOSPITAL ENCOUNTER (OUTPATIENT)
Dept: LAB | Facility: MEDICAL CENTER | Age: 42
End: 2022-03-15
Attending: FAMILY MEDICINE
Payer: COMMERCIAL

## 2022-03-15 ENCOUNTER — HOSPITAL ENCOUNTER (OUTPATIENT)
Dept: LAB | Facility: MEDICAL CENTER | Age: 42
End: 2022-03-15
Attending: OBSTETRICS & GYNECOLOGY
Payer: COMMERCIAL

## 2022-03-15 LAB
BASOPHILS # BLD AUTO: 0.4 % (ref 0–1.8)
BASOPHILS # BLD: 0.02 K/UL (ref 0–0.12)
EOSINOPHIL # BLD AUTO: 0.06 K/UL (ref 0–0.51)
EOSINOPHIL NFR BLD: 1.1 % (ref 0–6.9)
ERYTHROCYTE [DISTWIDTH] IN BLOOD BY AUTOMATED COUNT: 42.4 FL (ref 35.9–50)
HCT VFR BLD AUTO: 46.5 % (ref 42–52)
HGB BLD-MCNC: 15.4 G/DL (ref 14–18)
IMM GRANULOCYTES # BLD AUTO: 0.01 K/UL (ref 0–0.11)
IMM GRANULOCYTES NFR BLD AUTO: 0.2 % (ref 0–0.9)
LYMPHOCYTES # BLD AUTO: 0.99 K/UL (ref 1–4.8)
LYMPHOCYTES NFR BLD: 18.4 % (ref 22–41)
MCH RBC QN AUTO: 29.2 PG (ref 27–33)
MCHC RBC AUTO-ENTMCNC: 33.1 G/DL (ref 33.7–35.3)
MCV RBC AUTO: 88.2 FL (ref 81.4–97.8)
MONOCYTES # BLD AUTO: 1 K/UL (ref 0–0.85)
MONOCYTES NFR BLD AUTO: 18.6 % (ref 0–13.4)
NEUTROPHILS # BLD AUTO: 3.29 K/UL (ref 1.82–7.42)
NEUTROPHILS NFR BLD: 61.3 % (ref 44–72)
NRBC # BLD AUTO: 0 K/UL
NRBC BLD-RTO: 0 /100 WBC
PLATELET # BLD AUTO: 216 K/UL (ref 164–446)
PMV BLD AUTO: 11.1 FL (ref 9–12.9)
PROLACTIN SERPL-MCNC: 27.8 NG/ML (ref 2.1–17.7)
RBC # BLD AUTO: 5.27 M/UL (ref 4.7–6.1)
TESTOST SERPL-MCNC: 299 NG/DL (ref 175–781)
WBC # BLD AUTO: 5.4 K/UL (ref 4.8–10.8)

## 2022-03-15 PROCEDURE — 84146 ASSAY OF PROLACTIN: CPT

## 2022-03-15 PROCEDURE — 36415 COLL VENOUS BLD VENIPUNCTURE: CPT

## 2022-03-15 PROCEDURE — 85025 COMPLETE CBC W/AUTO DIFF WBC: CPT

## 2022-03-15 PROCEDURE — 84403 ASSAY OF TOTAL TESTOSTERONE: CPT

## 2022-03-17 PROCEDURE — RXMED WILLOW AMBULATORY MEDICATION CHARGE: Performed by: OBSTETRICS & GYNECOLOGY

## 2022-03-18 ENCOUNTER — PHARMACY VISIT (OUTPATIENT)
Dept: PHARMACY | Facility: MEDICAL CENTER | Age: 42
End: 2022-03-18
Payer: COMMERCIAL

## 2022-03-18 ENCOUNTER — PATIENT MESSAGE (OUTPATIENT)
Dept: SLEEP MEDICINE | Facility: MEDICAL CENTER | Age: 42
End: 2022-03-18
Payer: COMMERCIAL

## 2022-03-21 NOTE — TELEPHONE ENCOUNTER
From: Mert Carl II  To: Nurse Practitioner Molly Cash  Sent: 3/18/2022 3:58 PM PDT  Subject: New cpap machine    I received my replacement dream station unit and need to sync it to my ezio. Also want to be sure the pressures are the same as it seems low. The serial number is [id number]. Thank you

## 2022-03-31 ENCOUNTER — HOSPITAL ENCOUNTER (OUTPATIENT)
Dept: LAB | Facility: MEDICAL CENTER | Age: 42
End: 2022-03-31
Attending: OBSTETRICS & GYNECOLOGY
Payer: COMMERCIAL

## 2022-03-31 LAB — PROLACTIN SERPL-MCNC: 23.6 NG/ML (ref 2.1–17.7)

## 2022-03-31 PROCEDURE — 84146 ASSAY OF PROLACTIN: CPT

## 2022-03-31 PROCEDURE — 36415 COLL VENOUS BLD VENIPUNCTURE: CPT

## 2022-04-22 ENCOUNTER — OFFICE VISIT (OUTPATIENT)
Dept: ENDOCRINOLOGY | Facility: MEDICAL CENTER | Age: 42
End: 2022-04-22
Attending: NURSE PRACTITIONER
Payer: COMMERCIAL

## 2022-04-22 VITALS
BODY MASS INDEX: 25.03 KG/M2 | SYSTOLIC BLOOD PRESSURE: 132 MMHG | HEART RATE: 64 BPM | DIASTOLIC BLOOD PRESSURE: 80 MMHG | OXYGEN SATURATION: 100 % | WEIGHT: 169 LBS | HEIGHT: 69 IN

## 2022-04-22 DIAGNOSIS — E55.9 VITAMIN D DEFICIENCY: ICD-10-CM

## 2022-04-22 DIAGNOSIS — E22.1 HYPERPROLACTINEMIA (HCC): ICD-10-CM

## 2022-04-22 PROBLEM — D35.2 PITUITARY ADENOMA (HCC): Status: ACTIVE | Noted: 2021-11-10

## 2022-04-22 PROBLEM — F32.A DEPRESSION: Status: ACTIVE | Noted: 2022-04-22

## 2022-04-22 PROCEDURE — RXMED WILLOW AMBULATORY MEDICATION CHARGE: Performed by: NURSE PRACTITIONER

## 2022-04-22 PROCEDURE — 99211 OFF/OP EST MAY X REQ PHY/QHP: CPT | Performed by: NURSE PRACTITIONER

## 2022-04-22 PROCEDURE — 99214 OFFICE O/P EST MOD 30 MIN: CPT | Performed by: NURSE PRACTITIONER

## 2022-04-22 RX ORDER — BROMOCRIPTINE MESYLATE 2.5 MG/1
2.5 TABLET ORAL DAILY
COMMUNITY

## 2022-04-22 RX ORDER — CABERGOLINE 0.5 MG/1
0.5 TABLET ORAL
Qty: 24 TABLET | Refills: 3 | Status: SHIPPED | OUTPATIENT
Start: 2022-04-22 | End: 2023-05-12

## 2022-04-22 RX ORDER — ESCITALOPRAM OXALATE 10 MG/1
10 TABLET ORAL DAILY
COMMUNITY
Start: 2022-03-03

## 2022-04-22 ASSESSMENT — FIBROSIS 4 INDEX: FIB4 SCORE: 0.83

## 2022-04-22 NOTE — PROGRESS NOTES
Chief Complaint: Consult requested by Caio Pérez M.D. for evaluation of Pituitary Mass    Subjective:      Mert Carl II is a 41 y.o. male here for initial evaluation of pituitary tumor.  He was first diagnosed with a pituitary tumor in 11/05/2021.  Presenting symptoms which led to the investigation of the pituitary included hypogonadism as patient is trying to conceive with his spouse.  Initial MRI of the pituitary on 11/05/2021 revealed There is an approximately 3 to 4 mm sized questionable hypoenhancing area in the posterior portion of the pituitary gland. In view of hyperprolactinemia, this is suspicious for pituitary microadenoma..  Pituitary function testing revealed: Prolactin level elevated at 22.6, total testosterone 300.  Visual field testing revealed: NA.  Prior pituitary surgery: No.  Prior pituitary radiation therapy: No.    He denies headache, denies vision difficulties, denies frequent urination, denies increased thirst, denies galactorrhea.       Ref. Range 3/15/2022 09:59 3/31/2022 13:27   Prolactin Latest Ref Range: 2.10 - 17.70 ng/mL 27.80 (H) 23.60 (H)        Ref. Range 9/18/2021 09:42   Follicle Stimulating Hormone Latest Ref Range: 1.5 - 12.4 mIU/mL 9.8   Luteinizing Hormone Latest Ref Range: 1.7 - 8.6 IU/L 11.1 (H)   Prolactin Latest Ref Range: 2.10 - 17.70 ng/mL 22.60 (H)   Testosterone,Total Latest Ref Range: 175 - 781 ng/dL 300       Thyroid function previously mid to high normal:     Ref. Range 2/27/2021 10:06   TSH Latest Ref Range: 0.380 - 5.330 uIU/mL 3.440       Secondary to endocrinology schedule being booked out quite far patient's referring provider started him on bromocriptine 2.5 mg daily.  Patient has tolerated this therapy well and again is not symptomatic in any form.    Patient's medications, allergies, and social histories were reviewed and updated as appropriate.    ROS:     CONS:     No fever, no chills, no weight loss, no fatigue   EYES:      No diplopia,  no blurry vision, no redness of eyes, no swelling of eyelids   ENT:    No hearing loss, No ear pain, No sore throat, no dysphagia, no neck swelling   CV:     No chest pain, no palpitations, no claudication, no orthopnea, no PND   PULM:    No SOB, no cough, no hemoptysis, no wheezing    GI:   No nausea, no vomiting, no diarrhea, no constipation, no bloody stools   :  Passing urine well, no dysuria, no hematuria   ENDO:   No polyuria, no polydipsia, no heat intolerance, no cold intolerance   NEURO: No headaches, no dizziness, no convulsions, no tremors   MUSC:  No joint swellings, no arthralgias, no myalgias, no weakness   SKIN:   No rash, no ulcers, no dry skin   PSYCH:   No depression, no anxiety, no difficulty sleeping       Past Medical History:  Patient Active Problem List    Diagnosis Date Noted   • Anxiety 09/16/2021   • Hyperprolactinemia (HCC) 09/01/2021   • Hypogonadism 09/01/2021   • Absence of testicle in scrotum 08/16/2021   • Male infertility 08/16/2021       Past Surgical History:  No past surgical history on file.     Allergies:  Patient has no known allergies.     Current Medications:    Current Outpatient Medications:   •  bromocriptine (PARLODEL) 2.5 MG Tab, Take 1 tablet by mouth daily, Disp: 30 Tablet, Rfl: 2  •  cetirizine (ZYRTEC) 10 MG Tab, Take 1 tablet (10 mg) by mouth daily for 90 days., Disp: 90 Tablet, Rfl: 0  •  bromocriptine (PARLODEL) 2.5 MG Tab, Take half a tablet by mouth daily., Disp: 15 Tablet, Rfl: 1  •  escitalopram (LEXAPRO) 10 MG Tab, Take 1 tablet (10 mg) by mouth daily for 60 days., Disp: 60 Tablet, Rfl: 1  •  cetirizine (ZYRTEC) 10 MG Tab, Take 10 mg by mouth every day., Disp: , Rfl:   •  Zinc Sulfate (ZINC 15 PO), zinc, Disp: , Rfl:   •  escitalopram (LEXAPRO) 20 MG tablet, Take 1 tablet by mouth daily., Disp: 60 Tablet, Rfl: 1  •  ZINC GLUCONATE PO, Take  by mouth., Disp: , Rfl:   •  omeprazole (PRILOSEC) 40 MG delayed-release capsule, Take 1 capsule (40 mg) by mouth  "every morning before breakfast., Disp: 90 capsule, Rfl: 3    Social History:  Social History     Socioeconomic History   • Marital status:      Spouse name: Not on file   • Number of children: Not on file   • Years of education: Not on file   • Highest education level: Not on file   Occupational History   • Not on file   Tobacco Use   • Smoking status: Never Smoker   • Smokeless tobacco: Never Used   Vaping Use   • Vaping Use: Every day   • Substances: Nicotine   Substance and Sexual Activity   • Alcohol use: Yes     Comment: 2-3 a day    • Drug use: Not Currently   • Sexual activity: Not on file   Other Topics Concern   • Not on file   Social History Narrative   • Not on file     Social Determinants of Health     Financial Resource Strain: Not on file   Food Insecurity: Not on file   Transportation Needs: Not on file   Physical Activity: Not on file   Stress: Not on file   Social Connections: Not on file   Intimate Partner Violence: Not on file   Housing Stability: Not on file        Family History:   Family History   Problem Relation Age of Onset   • Sleep Apnea Neg Hx          PHYSICAL EXAM:   Vital signs: /80 (BP Location: Left arm, Patient Position: Sitting, BP Cuff Size: Adult)   Pulse 64   Ht 1.753 m (5' 9\")   Wt 76.7 kg (169 lb)   SpO2 100%   BMI 24.96 kg/m²   GENERAL: Well-developed, well-nourished  in no apparent distress.   EYE: No ocular and eyelid asymmetry, Anicteric sclerae,  PERRL, No exophthalmos or lidlag  HENT: Hearing grossly intact, Normocephalic, atraumatic. Pink, moist mucous membranes, No exudate  NECK: Supple. Trachea midline.   CARDIOVASCULAR: Regular rate and rhythm. No murmurs, rubs, or gallops.   LUNGS: Clear to auscultation bilaterally   ABDOMEN: Soft, nontender with positive bowel sounds.   EXTREMITIES: No clubbing, cyanosis, or edema.   NEUROLOGICAL: Cranial nerves II-XII are grossly intact   Symmetric reflexes at the patella no proximal muscle weakness, No visible " tremor with both outstretched hands  LYMPH: No cervical, supraclavicular,  adenopathy palpated.   SKIN: No rashes, lesions. Turgor is normal.    ASSESSMENT/PLAN:     1. Hyperprolactinemia (HCC)  Unstable.  Positive for microadenoma.  Patient is asymptomatic.  Recommend changing to cabergoline therapy 0.5 mg twice a week.  Realistically imaging does not need to be repeated in the future unless patient is experiencing acute symptoms.  Ideally cabergoline therapy can be discontinued after a year and at that point we will reassess prolactin level at that time.    Future lab orders:    - T3 FREE; Future  - FREE THYROXINE; Future  - TSH; Future  - VITAMIN D,25 HYDROXY; Future  - Comp Metabolic Panel; Future  - PROLACTIN; Future  - TESTOSTERONE, FREE AND TOTAL; Future  - FSH; Future  - LUTEINIZING HORMONE SERUM; Future  - CORTISOL; Future  - ESTRADIOL; Future    2. Vitamin D deficiency  Unstable.  Recommend current vitamin D level assay.      Complete labs 1 to 2 weeks prior to next appointment.  Next appointment in 4 months.    Thank you kindly for allowing me to participate in the endocrine care plan for this patient.    Michelle Robert, APRN  04/22/22    CC:   Caio Pérez M.D.

## 2022-04-27 ENCOUNTER — PHARMACY VISIT (OUTPATIENT)
Dept: PHARMACY | Facility: MEDICAL CENTER | Age: 42
End: 2022-04-27
Payer: COMMERCIAL

## 2022-05-13 ENCOUNTER — PHARMACY VISIT (OUTPATIENT)
Dept: PHARMACY | Facility: MEDICAL CENTER | Age: 42
End: 2022-05-13
Payer: COMMERCIAL

## 2022-05-13 PROCEDURE — RXMED WILLOW AMBULATORY MEDICATION CHARGE: Performed by: FAMILY MEDICINE

## 2022-05-13 RX ORDER — ESCITALOPRAM OXALATE 10 MG/1
TABLET ORAL
Qty: 60 TABLET | Refills: 1 | Status: SHIPPED | OUTPATIENT
Start: 2022-05-13 | End: 2022-11-10

## 2022-05-16 ENCOUNTER — HOSPITAL ENCOUNTER (OUTPATIENT)
Dept: LAB | Facility: MEDICAL CENTER | Age: 42
End: 2022-05-16
Attending: NURSE PRACTITIONER
Payer: COMMERCIAL

## 2022-05-16 DIAGNOSIS — E22.1 HYPERPROLACTINEMIA (HCC): ICD-10-CM

## 2022-05-16 LAB
ALBUMIN SERPL BCP-MCNC: 4.7 G/DL (ref 3.2–4.9)
ALBUMIN/GLOB SERPL: 1.7 G/DL
ALP SERPL-CCNC: 68 U/L (ref 30–99)
ALT SERPL-CCNC: 16 U/L (ref 2–50)
ANION GAP SERPL CALC-SCNC: 11 MMOL/L (ref 7–16)
AST SERPL-CCNC: 18 U/L (ref 12–45)
BILIRUB SERPL-MCNC: 0.4 MG/DL (ref 0.1–1.5)
BUN SERPL-MCNC: 12 MG/DL (ref 8–22)
CALCIUM SERPL-MCNC: 9.7 MG/DL (ref 8.5–10.5)
CHLORIDE SERPL-SCNC: 104 MMOL/L (ref 96–112)
CO2 SERPL-SCNC: 26 MMOL/L (ref 20–33)
CORTIS SERPL-MCNC: 9.8 UG/DL (ref 0–23)
CREAT SERPL-MCNC: 0.94 MG/DL (ref 0.5–1.4)
ESTRADIOL SERPL-MCNC: 18 PG/ML
FSH SERPL-ACNC: 10 MIU/ML (ref 1.5–12.4)
GFR SERPLBLD CREATININE-BSD FMLA CKD-EPI: 104 ML/MIN/1.73 M 2
GLOBULIN SER CALC-MCNC: 2.7 G/DL (ref 1.9–3.5)
GLUCOSE SERPL-MCNC: 88 MG/DL (ref 65–99)
LH SERPL-ACNC: 8.2 IU/L (ref 1.7–8.6)
POTASSIUM SERPL-SCNC: 4.1 MMOL/L (ref 3.6–5.5)
PROLACTIN SERPL-MCNC: 6.02 NG/ML (ref 2.1–17.7)
PROT SERPL-MCNC: 7.4 G/DL (ref 6–8.2)
SODIUM SERPL-SCNC: 141 MMOL/L (ref 135–145)
T3FREE SERPL-MCNC: 3.98 PG/ML (ref 2–4.4)
T4 FREE SERPL-MCNC: 0.91 NG/DL (ref 0.93–1.7)
TSH SERPL DL<=0.005 MIU/L-ACNC: 1.73 UIU/ML (ref 0.38–5.33)

## 2022-05-16 PROCEDURE — 83001 ASSAY OF GONADOTROPIN (FSH): CPT

## 2022-05-16 PROCEDURE — 82306 VITAMIN D 25 HYDROXY: CPT

## 2022-05-16 PROCEDURE — 82670 ASSAY OF TOTAL ESTRADIOL: CPT

## 2022-05-16 PROCEDURE — 82533 TOTAL CORTISOL: CPT

## 2022-05-16 PROCEDURE — 84481 FREE ASSAY (FT-3): CPT

## 2022-05-16 PROCEDURE — 84439 ASSAY OF FREE THYROXINE: CPT

## 2022-05-16 PROCEDURE — 84270 ASSAY OF SEX HORMONE GLOBUL: CPT

## 2022-05-16 PROCEDURE — 80053 COMPREHEN METABOLIC PANEL: CPT

## 2022-05-16 PROCEDURE — 84403 ASSAY OF TOTAL TESTOSTERONE: CPT

## 2022-05-16 PROCEDURE — 84402 ASSAY OF FREE TESTOSTERONE: CPT

## 2022-05-16 PROCEDURE — 36415 COLL VENOUS BLD VENIPUNCTURE: CPT

## 2022-05-16 PROCEDURE — 84443 ASSAY THYROID STIM HORMONE: CPT

## 2022-05-16 PROCEDURE — 83002 ASSAY OF GONADOTROPIN (LH): CPT

## 2022-05-16 PROCEDURE — 84146 ASSAY OF PROLACTIN: CPT

## 2022-05-18 LAB
SHBG SERPL-SCNC: 28 NMOL/L (ref 17–56)
TESTOST FREE MFR SERPL: 2 % (ref 1.6–2.9)
TESTOST FREE SERPL-MCNC: 74 PG/ML (ref 47–244)
TESTOST SERPL-MCNC: 373 NG/DL (ref 300–890)

## 2022-05-21 LAB — 25(OH)D3 SERPL-MCNC: 19 NG/ML (ref 30–80)

## 2022-06-08 PROCEDURE — RXMED WILLOW AMBULATORY MEDICATION CHARGE: Performed by: FAMILY MEDICINE

## 2022-06-13 ENCOUNTER — PHARMACY VISIT (OUTPATIENT)
Dept: PHARMACY | Facility: MEDICAL CENTER | Age: 42
End: 2022-06-13
Payer: COMMERCIAL

## 2022-07-22 ENCOUNTER — PATIENT MESSAGE (OUTPATIENT)
Dept: SLEEP MEDICINE | Facility: MEDICAL CENTER | Age: 42
End: 2022-07-22
Payer: COMMERCIAL

## 2022-07-25 NOTE — PROGRESS NOTES
Compliance report notes data only from March 2022 of 13 days of use, average nightly use 4 hours 33 minutes, no significant mask leak with an overall AHI of 4.8/h.  Patient is using CPAP 11 cm.

## 2022-08-26 ENCOUNTER — OFFICE VISIT (OUTPATIENT)
Dept: ENDOCRINOLOGY | Facility: MEDICAL CENTER | Age: 42
End: 2022-08-26
Payer: COMMERCIAL

## 2022-08-26 VITALS
HEART RATE: 75 BPM | OXYGEN SATURATION: 97 % | HEIGHT: 69 IN | WEIGHT: 167 LBS | SYSTOLIC BLOOD PRESSURE: 122 MMHG | DIASTOLIC BLOOD PRESSURE: 62 MMHG | BODY MASS INDEX: 24.73 KG/M2

## 2022-08-26 DIAGNOSIS — E55.9 VITAMIN D DEFICIENCY: ICD-10-CM

## 2022-08-26 DIAGNOSIS — E22.1 HYPERPROLACTINEMIA (HCC): ICD-10-CM

## 2022-08-26 PROCEDURE — 99211 OFF/OP EST MAY X REQ PHY/QHP: CPT

## 2022-08-26 PROCEDURE — RXMED WILLOW AMBULATORY MEDICATION CHARGE: Performed by: FAMILY MEDICINE

## 2022-08-26 PROCEDURE — 99214 OFFICE O/P EST MOD 30 MIN: CPT

## 2022-08-26 ASSESSMENT — FIBROSIS 4 INDEX: FIB4 SCORE: .8541666666666666667

## 2022-08-26 NOTE — PROGRESS NOTES
Chief Complaint:Follow-up on the following conditions  Subjective:    Mert Carl II is a 41 y.o. male     Hyperprolactinemia:  He was prescribed Bromocriptine 2.5 mg daily by his fertility clinic initially  On his last appointment with Michelle Robert is switching to cabergoline 0.5 mg twice a week-since April of 2022  Patient has tolerated this therapy well and again is not symptomatic in any form.    He denies headache, denies vision difficulties, denies frequent urination, denies increased thirst, denies galactorrhea.  Last visual field checks was last year-patient is aware that this has to be done every year     Latest Reference Range & Units 3/31/22 13:27 5/16/22 13:54   Prolactin 2.10 - 17.70 ng/mL 23.60 (H) 6.02        Latest Reference Range & Units 5/16/22 13:54   Estradiol-E2 pg/mL 18.0   Follicle Stimulating Hormone 1.5 - 12.4 mIU/mL 10.0   Free Testosterone 47 - 244 pg/mL 74   Luteinizing Hormone 1.7 - 8.6 IU/L 8.2      Latest Reference Range & Units 5/16/22 13:54   Sex Hormone Bind Globulin 17 - 56 nmol/L 28   Testosterone % Free 1.6 - 2.9 % 2.0   Testosterone,Total 300 - 890 ng/dL 373      Latest Reference Range & Units 5/16/22 13:54   Cortisol 0.0 - 23.0 ug/dL 9.8     He uses a C pap, he works nights-reports fatigue,   Denies constipation, dry    Latest Reference Range & Units 5/16/22 13:54   TSH 0.380 - 5.330 uIU/mL 1.730   Free T-4 0.93 - 1.70 ng/dL 0.91 (L)   T3,Free 2.00 - 4.40 pg/mL 3.98         History:  He was first diagnosed with a pituitary tumor in 11/05/2021.    Presenting symptoms which led to the investigation of the pituitary included hypogonadism as patient is trying to conceive with his spouse.    Initial MRI of the pituitary on 11/05/2021 revealed There is an approximately 3 to 4 mm sized questionable hypoenhancing area in the posterior portion of the pituitary gland. In view of hyperprolactinemia, this is suspicious for pituitary microadenoma..      Pituitary function  testing revealed: Prolactin level elevated at 22.6, total testosterone 300.    Visual field testing revealed: NA.    Prior pituitary surgery: No.    Prior pituitary radiation therapy: No.      2.  Vitamin D deficiency:  Currently not taking any supplementation   Latest Reference Range & Units 5/16/22 13:54   25-Hydroxy   Vitamin D 25 30 - 80 ng/mL 19 (L)       Patient's medications, allergies, and social histories were reviewed and updated as appropriate.    ROS:     CONS:     No fever, no chills, no weight loss, no fatigue   EYES:      No diplopia, no blurry vision, no redness of eyes, no swelling of eyelids   ENT:    No hearing loss, No ear pain, No sore throat, no dysphagia, no neck swelling   CV:     No chest pain, no palpitations, no claudication, no orthopnea, no PND   PULM:    No SOB, no cough, no hemoptysis, no wheezing    GI:   No nausea, no vomiting, no diarrhea, no constipation, no bloody stools   :  Passing urine well, no dysuria, no hematuria   ENDO:   No polyuria, no polydipsia, no heat intolerance, no cold intolerance   NEURO: No headaches, no dizziness, no convulsions, no tremors   MUSC:  No joint swellings, no arthralgias, no myalgias, no weakness   SKIN:   No rash, no ulcers, no dry skin   PSYCH:   No depression, no anxiety, no difficulty sleeping       Past Medical History:  Patient Active Problem List    Diagnosis Date Noted    Depression 04/22/2022    Pituitary adenoma ([facility]) 11/10/2021    Anxiety 09/16/2021    Hyperprolactinemia ([facility]) 09/01/2021    Hypogonadism 09/01/2021    Absence of testicle in scrotum 08/16/2021    Male infertility 08/16/2021       Past Surgical History:  No past surgical history on file.     Allergies:  Patient has no known allergies.     Current Medications:    Current Outpatient Medications:     cetirizine (ZYRTEC) 10 MG Tab, Take 1 tablet (10 mg) by mouth daily for 90 days., Disp: 90 Tablet, Rfl: 1    escitalopram (LEXAPRO) 10 MG Tab, Take 1 tablet by mouth daily for  "60 days., Disp: 60 Tablet, Rfl: 1    cabergoline (DOSTINEX) 0.5 MG tablet, Take 1 Tablet by mouth every 72 hours., Disp: 24 Tablet, Rfl: 3    MULTIPLE VITAMINS-MINERALS ER PO, Take 50 mg by mouth every day., Disp: , Rfl:     bromocriptine (PARLODEL) 2.5 MG Tab, Take 2.5 mg by mouth every day., Disp: , Rfl:     escitalopram (LEXAPRO) 10 MG Tab, Take 10 mg by mouth every day., Disp: , Rfl:     Zinc Sulfate (ZINC 15 PO), zinc, Disp: , Rfl:     ZINC GLUCONATE PO, Take  by mouth., Disp: , Rfl:     omeprazole (PRILOSEC) 40 MG delayed-release capsule, Take 1 capsule (40 mg) by mouth every morning before breakfast., Disp: 90 capsule, Rfl: 3    Social History:  Social History     Socioeconomic History    Marital status:      Spouse name: Not on file    Number of children: Not on file    Years of education: Not on file    Highest education level: Not on file   Occupational History    Not on file   Tobacco Use    Smoking status: Never    Smokeless tobacco: Never   Vaping Use    Vaping Use: Every day    Substances: Nicotine   Substance and Sexual Activity    Alcohol use: Yes     Comment: 2-3 a day     Drug use: Not Currently    Sexual activity: Not on file   Other Topics Concern    Not on file   Social History Narrative    Not on file     Social Determinants of Health     Financial Resource Strain: Not on file   Food Insecurity: Not on file   Transportation Needs: Not on file   Physical Activity: Not on file   Stress: Not on file   Social Connections: Not on file   Intimate Partner Violence: Not on file   Housing Stability: Not on file        Family History:   Family History   Problem Relation Age of Onset    Sleep Apnea Neg Hx          PHYSICAL EXAM:   Vital signs: /62   Pulse 75   Ht 1.753 m (5' 9\")   Wt 75.8 kg (167 lb)   SpO2 97%   BMI 24.66 kg/m²   GENERAL: Well-developed, well-nourished  in no apparent distress.   EYE: No ocular and eyelid asymmetry, Anicteric sclerae,  PERRL, No exophthalmos or " lidlag  HENT: Hearing grossly intact, Normocephalic, atraumatic.   NECK: Supple. Trachea midline.   CARDIOVASCULAR: Regular rate and rhythm. No murmurs, rubs, or gallops.   LUNGS: Clear to auscultation bilaterally   EXTREMITIES: No clubbing, cyanosis, or edema.   NEUROLOGICAL: Cranial nerves II-XII are grossly intact   Symmetric reflexes at the patella no proximal muscle weakness, No visible tremor with both outstretched hands  LYMPH: No cervical, supraclavicular,  adenopathy palpated.   SKIN: No rashes, lesions. Turgor is normal.    ASSESSMENT/PLAN:   1. Hyperprolactinemia (HCC)  Stable  Patient is asymptomatic and stable-at this time we will repeat an MRI at next symptoms of depression, this will be monitored closely  Continue cabergoline 0.5 mg twice a week  We will have the discussion to titrate his cabergoline down around April 2023 and monitor prolactin levels every 3 months x 4 then every 6 months x 2, then every year    - TSH; Future  - FREE THYROXINE; Future  - Comp Metabolic Panel; Future  - PROLACTIN; Future    2. Vitamin D deficiency  Unstable  Recommended to take 3000 IUs OTC of vitamin D3 in the summer, and 5000 IUs OTC of vitamin D3 in the winter  - VITAMIN D,25 HYDROXY (DEFICIENCY); Future       Disposition: Make an appointment to follow-up in 6 months  Do your blood work 1 to 2 weeks prior to next appointment    Thank you kindly for allowing me to participate in the endocrine care plan for this patient.    Juan Alberto Hoffman, [email or web address].  08/26/2022    CC:   Caio Pérez M.D.

## 2022-09-08 ENCOUNTER — PHARMACY VISIT (OUTPATIENT)
Dept: PHARMACY | Facility: MEDICAL CENTER | Age: 42
End: 2022-09-08
Payer: COMMERCIAL

## 2022-09-15 PROCEDURE — RXMED WILLOW AMBULATORY MEDICATION CHARGE: Performed by: NURSE PRACTITIONER

## 2022-09-23 ENCOUNTER — PHARMACY VISIT (OUTPATIENT)
Dept: PHARMACY | Facility: MEDICAL CENTER | Age: 42
End: 2022-09-23
Payer: COMMERCIAL

## 2022-10-07 ENCOUNTER — HOSPITAL ENCOUNTER (OUTPATIENT)
Dept: LAB | Facility: MEDICAL CENTER | Age: 42
End: 2022-10-07
Payer: COMMERCIAL

## 2022-10-07 DIAGNOSIS — E55.9 VITAMIN D DEFICIENCY: ICD-10-CM

## 2022-10-07 DIAGNOSIS — E22.1 HYPERPROLACTINEMIA (HCC): ICD-10-CM

## 2022-10-07 LAB
25(OH)D3 SERPL-MCNC: 18 NG/ML (ref 30–100)
ALBUMIN SERPL BCP-MCNC: 4.2 G/DL (ref 3.2–4.9)
ALBUMIN/GLOB SERPL: 1.4 G/DL
ALP SERPL-CCNC: 79 U/L (ref 30–99)
ALT SERPL-CCNC: 41 U/L (ref 2–50)
ANION GAP SERPL CALC-SCNC: 11 MMOL/L (ref 7–16)
AST SERPL-CCNC: 39 U/L (ref 12–45)
BILIRUB SERPL-MCNC: 0.5 MG/DL (ref 0.1–1.5)
BUN SERPL-MCNC: 12 MG/DL (ref 8–22)
CALCIUM SERPL-MCNC: 9.3 MG/DL (ref 8.5–10.5)
CHLORIDE SERPL-SCNC: 101 MMOL/L (ref 96–112)
CO2 SERPL-SCNC: 26 MMOL/L (ref 20–33)
CREAT SERPL-MCNC: 0.86 MG/DL (ref 0.5–1.4)
GFR SERPLBLD CREATININE-BSD FMLA CKD-EPI: 111 ML/MIN/1.73 M 2
GLOBULIN SER CALC-MCNC: 3 G/DL (ref 1.9–3.5)
GLUCOSE SERPL-MCNC: 73 MG/DL (ref 65–99)
POTASSIUM SERPL-SCNC: 4 MMOL/L (ref 3.6–5.5)
PROLACTIN SERPL-MCNC: 3.39 NG/ML (ref 2.1–17.7)
PROT SERPL-MCNC: 7.2 G/DL (ref 6–8.2)
SODIUM SERPL-SCNC: 138 MMOL/L (ref 135–145)
T4 FREE SERPL-MCNC: 0.77 NG/DL (ref 0.93–1.7)
TSH SERPL DL<=0.005 MIU/L-ACNC: 1.88 UIU/ML (ref 0.38–5.33)

## 2022-10-07 PROCEDURE — 36415 COLL VENOUS BLD VENIPUNCTURE: CPT

## 2022-10-07 PROCEDURE — 82306 VITAMIN D 25 HYDROXY: CPT

## 2022-10-07 PROCEDURE — 84146 ASSAY OF PROLACTIN: CPT

## 2022-10-07 PROCEDURE — 84443 ASSAY THYROID STIM HORMONE: CPT

## 2022-10-07 PROCEDURE — 84439 ASSAY OF FREE THYROXINE: CPT

## 2022-10-07 PROCEDURE — 80053 COMPREHEN METABOLIC PANEL: CPT

## 2022-10-25 PROCEDURE — RXMED WILLOW AMBULATORY MEDICATION CHARGE: Performed by: FAMILY MEDICINE

## 2022-10-26 ENCOUNTER — HOSPITAL ENCOUNTER (OUTPATIENT)
Dept: LAB | Facility: MEDICAL CENTER | Age: 42
End: 2022-10-26
Attending: FAMILY MEDICINE
Payer: COMMERCIAL

## 2022-10-26 LAB
ALBUMIN SERPL BCP-MCNC: 4.4 G/DL (ref 3.2–4.9)
ALBUMIN/GLOB SERPL: 1.5 G/DL
ALP SERPL-CCNC: 66 U/L (ref 30–99)
ALT SERPL-CCNC: 42 U/L (ref 2–50)
ANION GAP SERPL CALC-SCNC: 12 MMOL/L (ref 7–16)
AST SERPL-CCNC: 36 U/L (ref 12–45)
BASOPHILS # BLD AUTO: 1 % (ref 0–1.8)
BASOPHILS # BLD: 0.05 K/UL (ref 0–0.12)
BILIRUB SERPL-MCNC: 0.3 MG/DL (ref 0.1–1.5)
BUN SERPL-MCNC: 6 MG/DL (ref 8–22)
CALCIUM SERPL-MCNC: 9.1 MG/DL (ref 8.5–10.5)
CHLORIDE SERPL-SCNC: 106 MMOL/L (ref 96–112)
CO2 SERPL-SCNC: 26 MMOL/L (ref 20–33)
CREAT SERPL-MCNC: 0.83 MG/DL (ref 0.5–1.4)
EOSINOPHIL # BLD AUTO: 0.04 K/UL (ref 0–0.51)
EOSINOPHIL NFR BLD: 0.8 % (ref 0–6.9)
ERYTHROCYTE [DISTWIDTH] IN BLOOD BY AUTOMATED COUNT: 43.1 FL (ref 35.9–50)
EST. AVERAGE GLUCOSE BLD GHB EST-MCNC: 108 MG/DL
GFR SERPLBLD CREATININE-BSD FMLA CKD-EPI: 112 ML/MIN/1.73 M 2
GLOBULIN SER CALC-MCNC: 2.9 G/DL (ref 1.9–3.5)
GLUCOSE SERPL-MCNC: 92 MG/DL (ref 65–99)
HBA1C MFR BLD: 5.4 % (ref 4–5.6)
HCT VFR BLD AUTO: 39.8 % (ref 42–52)
HGB BLD-MCNC: 13.1 G/DL (ref 14–18)
IMM GRANULOCYTES # BLD AUTO: 0.02 K/UL (ref 0–0.11)
IMM GRANULOCYTES NFR BLD AUTO: 0.4 % (ref 0–0.9)
IRON SATN MFR SERPL: 32 % (ref 15–55)
IRON SERPL-MCNC: 95 UG/DL (ref 50–180)
LYMPHOCYTES # BLD AUTO: 1.47 K/UL (ref 1–4.8)
LYMPHOCYTES NFR BLD: 28.3 % (ref 22–41)
MCH RBC QN AUTO: 30 PG (ref 27–33)
MCHC RBC AUTO-ENTMCNC: 32.9 G/DL (ref 33.7–35.3)
MCV RBC AUTO: 91.3 FL (ref 81.4–97.8)
MONOCYTES # BLD AUTO: 0.8 K/UL (ref 0–0.85)
MONOCYTES NFR BLD AUTO: 15.4 % (ref 0–13.4)
NEUTROPHILS # BLD AUTO: 2.82 K/UL (ref 1.82–7.42)
NEUTROPHILS NFR BLD: 54.1 % (ref 44–72)
NRBC # BLD AUTO: 0 K/UL
NRBC BLD-RTO: 0 /100 WBC
PLATELET # BLD AUTO: 210 K/UL (ref 164–446)
PMV BLD AUTO: 9.7 FL (ref 9–12.9)
POTASSIUM SERPL-SCNC: 3.8 MMOL/L (ref 3.6–5.5)
PROT SERPL-MCNC: 7.3 G/DL (ref 6–8.2)
RBC # BLD AUTO: 4.36 M/UL (ref 4.7–6.1)
SODIUM SERPL-SCNC: 144 MMOL/L (ref 135–145)
TIBC SERPL-MCNC: 298 UG/DL (ref 250–450)
TSH SERPL DL<=0.005 MIU/L-ACNC: 1.03 UIU/ML (ref 0.38–5.33)
UIBC SERPL-MCNC: 203 UG/DL (ref 110–370)
WBC # BLD AUTO: 5.2 K/UL (ref 4.8–10.8)

## 2022-10-26 PROCEDURE — 36415 COLL VENOUS BLD VENIPUNCTURE: CPT

## 2022-10-26 PROCEDURE — 83550 IRON BINDING TEST: CPT

## 2022-10-26 PROCEDURE — 84443 ASSAY THYROID STIM HORMONE: CPT

## 2022-10-26 PROCEDURE — 83036 HEMOGLOBIN GLYCOSYLATED A1C: CPT

## 2022-10-26 PROCEDURE — 85025 COMPLETE CBC W/AUTO DIFF WBC: CPT

## 2022-10-26 PROCEDURE — 83540 ASSAY OF IRON: CPT

## 2022-10-26 PROCEDURE — 80053 COMPREHEN METABOLIC PANEL: CPT

## 2022-10-28 PROCEDURE — RXMED WILLOW AMBULATORY MEDICATION CHARGE: Performed by: FAMILY MEDICINE

## 2022-10-29 ENCOUNTER — PHARMACY VISIT (OUTPATIENT)
Dept: PHARMACY | Facility: MEDICAL CENTER | Age: 42
End: 2022-10-29
Payer: COMMERCIAL

## 2022-10-31 ENCOUNTER — NON-PROVIDER VISIT (OUTPATIENT)
Dept: SLEEP MEDICINE | Facility: MEDICAL CENTER | Age: 42
End: 2022-10-31
Attending: NURSE PRACTITIONER
Payer: COMMERCIAL

## 2022-10-31 ENCOUNTER — PHARMACY VISIT (OUTPATIENT)
Dept: PHARMACY | Facility: MEDICAL CENTER | Age: 42
End: 2022-10-31
Payer: COMMERCIAL

## 2022-10-31 VITALS — HEIGHT: 69 IN | BODY MASS INDEX: 25.18 KG/M2 | WEIGHT: 170 LBS

## 2022-10-31 DIAGNOSIS — R06.02 SOB (SHORTNESS OF BREATH): ICD-10-CM

## 2022-10-31 DIAGNOSIS — K21.9 GASTRO-ESOPHAGEAL REFLUX DISEASE WITHOUT ESOPHAGITIS: ICD-10-CM

## 2022-10-31 DIAGNOSIS — R06.6 HICCOUGH: ICD-10-CM

## 2022-10-31 PROCEDURE — 94060 EVALUATION OF WHEEZING: CPT | Performed by: INTERNAL MEDICINE

## 2022-10-31 PROCEDURE — 94729 DIFFUSING CAPACITY: CPT | Performed by: INTERNAL MEDICINE

## 2022-10-31 PROCEDURE — 94726 PLETHYSMOGRAPHY LUNG VOLUMES: CPT | Performed by: INTERNAL MEDICINE

## 2022-10-31 ASSESSMENT — FIBROSIS 4 INDEX: FIB4 SCORE: 1.08

## 2022-10-31 NOTE — PROCEDURES
Technician: DESIRE Becerra    Technician Comment:  Good patient effort & cooperation.  The results of this test meet the ATS/ERS standards for acceptability & reproducibility.  Test was performed on the [employer] Body Plethysmograph-Elite DX system.  Predicted values were GLI-2012 for spirometry, GLI-2017 for DLCO, ITS for Lung Volumes.  The DLCO was uncorrected for Hgb.  A bronchodilator of Ventolin HFA -2puffs via spacer administered.  DLCO performed during dilation period.    Interpretation:   Baseline spirometry shows normal airflows.  There is significant bronchodilator response by absolute increase in FEV1.  Lung volumes are within normal limits.  Diffusion capacity is within normal limits.  Normal pulmonary function testing with normal flow volume loop.

## 2022-11-01 PROCEDURE — RXMED WILLOW AMBULATORY MEDICATION CHARGE: Performed by: FAMILY MEDICINE

## 2022-11-01 RX ORDER — OMEPRAZOLE 40 MG/1
CAPSULE, DELAYED RELEASE ORAL
Qty: 90 CAPSULE | Refills: 3 | OUTPATIENT
Start: 2022-11-01

## 2022-11-04 ENCOUNTER — PHARMACY VISIT (OUTPATIENT)
Dept: PHARMACY | Facility: MEDICAL CENTER | Age: 42
End: 2022-11-04
Payer: COMMERCIAL

## 2022-11-10 ENCOUNTER — OFFICE VISIT (OUTPATIENT)
Dept: SLEEP MEDICINE | Facility: MEDICAL CENTER | Age: 42
End: 2022-11-10
Payer: COMMERCIAL

## 2022-11-10 VITALS
RESPIRATION RATE: 16 BRPM | WEIGHT: 167 LBS | BODY MASS INDEX: 24.73 KG/M2 | HEART RATE: 78 BPM | DIASTOLIC BLOOD PRESSURE: 74 MMHG | HEIGHT: 69 IN | SYSTOLIC BLOOD PRESSURE: 126 MMHG | OXYGEN SATURATION: 96 %

## 2022-11-10 DIAGNOSIS — G47.33 OBSTRUCTIVE SLEEP APNEA: ICD-10-CM

## 2022-11-10 DIAGNOSIS — Z78.9 NONSMOKER: ICD-10-CM

## 2022-11-10 PROCEDURE — 99214 OFFICE O/P EST MOD 30 MIN: CPT | Performed by: NURSE PRACTITIONER

## 2022-11-10 ASSESSMENT — FIBROSIS 4 INDEX: FIB4 SCORE: 1.08

## 2022-11-10 NOTE — PROGRESS NOTES
Chief Complaint   Patient presents with    Follow-Up     last seen 9/16/2021    Results     PFT 10/31/2022       HPI:  Mert Carl II is a 41 y.o. year old male here today for follow-up on JUAN MANUEL and PFT results.  Last OV 9/16/21     Currently using CPAP @ 11cm H20 nightly; RESPIRONICS; device obtained 2021.  Compliance report 10/10/2022 through 11/8/2022 indicates 17 days of use, average nightly 6 hours 35 minutes, minimal mask leak with full facemask and overall AHI of 6.6/h.  Reviewed in detail with patient.  He notes having recent mask issues with head strap rubbing on his ears and he would remove it at night.  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  He notes numbness falling asleep on the couch when he gets home and not going directly to bed.  We reviewed sleep hygiene in depth.  He is awaiting a new shipment of supplies so his mask will fit better.  He denies morning headaches after using CPAP.  He continues to have exhaustion and fatigue which I feel is related to inconsistent use at this time.  He denies cardiac or respiratory symptoms today.    He is recently had sick symptoms and difficulty maintaining oxygen levels so PFT was ordered.  PFT notes normal spirometry.    SLEEP HX:  PSG split night 2/27/21 noted sleep efficiency of 50%, overall AHI 51.4/h and REM AHI 52.71/h with supine AHI 57.01/h.  39% events were central.  PLMS index of 23.1/h.  Mean SPO2 97% with O2 mikal of 89%.  Patient was titrated on CPAP and BiPAP with best tolerated pressure BiPAP 18/13 cm.  AHI improved to 3.53/h and O2 mikal of 95%.  Treatment emergent central apneas noted.  Overall incomplete titration study.         ROS: As per HPI and otherwise negative if not stated.    Past Medical History:   Diagnosis Date    Chickenpox        History reviewed. No pertinent surgical history.    Family History   Problem Relation Age of Onset    Sleep Apnea Neg Hx        Social History  "    Socioeconomic History    Marital status:      Spouse name: Not on file    Number of children: Not on file    Years of education: Not on file    Highest education level: Not on file   Occupational History    Not on file   Tobacco Use    Smoking status: Never    Smokeless tobacco: Never   Vaping Use    Vaping Use: Every day    Substances: Nicotine   Substance and Sexual Activity    Alcohol use: Yes     Comment: 2-3 a day     Drug use: Not Currently    Sexual activity: Not on file   Other Topics Concern    Not on file   Social History Narrative    Not on file     Social Determinants of Health     Financial Resource Strain: Not on file   Food Insecurity: Not on file   Transportation Needs: Not on file   Physical Activity: Not on file   Stress: Not on file   Social Connections: Not on file   Intimate Partner Violence: Not on file   Housing Stability: Not on file       Allergies as of 11/10/2022    (No Known Allergies)        Vitals:  /74 (BP Location: Left arm, Patient Position: Sitting, BP Cuff Size: Adult)   Pulse 78   Resp 16   Ht 1.753 m (5' 9\")   Wt 75.8 kg (167 lb)   SpO2 96%     Current medications as of today   Current Outpatient Medications   Medication Sig Dispense Refill    omeprazole (PRILOSEC) 40 MG delayed-release capsule Take 1 capsule (40 mg) by mouth every morning before breakfast. 90 Capsule 3    amLODIPine (NORVASC) 2.5 MG Tab Take 1 tablet (2.5 mg) by mouth once daily for 30 days. 30 Tablet 2    cetirizine (ZYRTEC) 10 MG Tab Take 1 tablet (10 mg) by mouth daily for 90 days. 90 Tablet 1    cabergoline (DOSTINEX) 0.5 MG tablet Take 1 Tablet by mouth every 72 hours. 24 Tablet 3    MULTIPLE VITAMINS-MINERALS ER PO Take 50 mg by mouth every day.      bromocriptine (PARLODEL) 2.5 MG Tab Take 1 Tablet by mouth every day.      escitalopram (LEXAPRO) 10 MG Tab Take 1 Tablet by mouth every day.      Zinc Sulfate (ZINC 15 PO) zinc      ZINC GLUCONATE PO Take  by mouth.       No current " facility-administered medications for this visit.         Physical Exam:   Gen:           Alert and oriented, No apparent distress. Mood and affect appropriate, normal interaction with examiner.  Eyes:          PERRL, EOM intact, sclere white, conjunctive moist.  Ears:          Not examined.   Hearing:     Grossly intact.  Nose:          Normal, no lesions or deformities.  Dentition:    mask  Oropharynx:   mask  Mallampati Classification: mask  Neck:        Supple, trachea midline, no masses.  Respiratory Effort: No intercostal retractions or use of accessory muscles.   Lung Auscultation:      Clear to auscultation bilaterally; no rales, rhonchi or wheezing.  CV:            Regular rate and rhythm. No murmurs, rubs or gallops.  Abd:           Not examined.   Lymphadenopathy: Not examined.  Gait and Station: Normal.  Digits and Nails: No clubbing, cyanosis, petechiae, or nodes.   Cranial Nerves: II-XII grossly intact.  Skin:        No rashes, lesions or ulcers noted.               Ext:           No cyanosis or edema.      Assessment:  1. Obstructive sleep apnea  DME Mask and Supplies      2. BMI 24.0-24.9, adult        3. Nonsmoker            Immunizations:    Flu:11/3/22  Pneumovax 23:not due  Prevnar 13:not due  PCV 20: not due  COVID-19: 5/13/21, booster recommended    Plan:  JUAN MANUEL is not well controlled at this time due to intermittent use.  Reviewed the importance of consistent nightly use for 6 to 9 hours ideally for maximal benefit.  Reviewed the need to set alarms and to go directly to bed once he comes home to avoid falling asleep on the couch not putting his device on.  Continue CPAP 11 cm nightly  If fatigue does not improve with consistent use recommend performing overnight oximetry  Follow-up with primary care for other health concerns  Follow-up in 2 months for compliance check, sooner if needed.    Please note that this dictation was created using voice recognition software. I have made every reasonable  attempt to correct obvious errors, but it is possible there are errors of grammar and possibly content that I did not discover before finalizing the note.

## 2022-11-10 NOTE — LETTER
JUAN ANTONIO Elizabeth  [facility]   [address], [address] [place] [address]  Phone: [phone] - Fax:             Encounter Date: 11/10/2022  Provider: JUAN ANTONIO Elizabeth  Location of Care: [place] [facility]  [address] [facility] [address]      Patient:   Mert Carl II   MR Number: [id number]   YOB: 1980     PROGRESS NOTE:  Chief Complaint   Patient presents with   • Follow-Up     last seen 9/16/2021   • Results     PFT 10/31/2022       HPI:  Mert Carl II is a 41 y.o. year old male here today for follow-up on JUAN MANUEL and PFT results.  Last OV 9/16/21     Currently using CPAP @ 11cm H20 nightly; RESPIRONICS; device obtained 2021.  Compliance report 10/10/2022 through 11/8/2022 indicates 17 days of use, average nightly 6 hours 35 minutes, minimal mask leak with full facemask and overall AHI of 6.6/h.  Reviewed in detail with patient.  He notes having recent mask issues with head strap rubbing on his ears and he would remove it at night.  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  He notes numbness falling asleep on the couch when he gets home and not going directly to bed.  We reviewed sleep hygiene in depth.  He is awaiting a new shipment of supplies so his mask will fit better.  He denies morning headaches after using CPAP.  He continues to have exhaustion and fatigue which I feel is related to inconsistent use at this time.  He denies cardiac or respiratory symptoms today.    He is recently had sick symptoms and difficulty maintaining oxygen levels so PFT was ordered.  PFT notes normal spirometry.    SLEEP HX:  PSG split night 2/27/21 noted sleep efficiency of 50%, overall AHI 51.4/h and REM AHI 52.71/h with supine AHI 57.01/h.  39% events were central.  PLMS index of 23.1/h.  Mean SPO2 97% with O2 mikal of 89%.  Patient was  "titrated on CPAP and BiPAP with best tolerated pressure BiPAP 18/13 cm.  AHI improved to 3.53/h and O2 mikal of 95%.  Treatment emergent central apneas noted.  Overall incomplete titration study.         ROS: As per HPI and otherwise negative if not stated.    Past Medical History:   Diagnosis Date   • Chickenpox        History reviewed. No pertinent surgical history.    Family History   Problem Relation Age of Onset   • Sleep Apnea Neg Hx        Social History     Socioeconomic History   • Marital status:      Spouse name: Not on file   • Number of children: Not on file   • Years of education: Not on file   • Highest education level: Not on file   Occupational History   • Not on file   Tobacco Use   • Smoking status: Never   • Smokeless tobacco: Never   Vaping Use   • Vaping Use: Every day   • Substances: Nicotine   Substance and Sexual Activity   • Alcohol use: Yes     Comment: 2-3 a day    • Drug use: Not Currently   • Sexual activity: Not on file   Other Topics Concern   • Not on file   Social History Narrative   • Not on file     Social Determinants of Health     Financial Resource Strain: Not on file   Food Insecurity: Not on file   Transportation Needs: Not on file   Physical Activity: Not on file   Stress: Not on file   Social Connections: Not on file   Intimate Partner Violence: Not on file   Housing Stability: Not on file       Allergies as of 11/10/2022   • (No Known Allergies)        Vitals:  /74 (BP Location: Left arm, Patient Position: Sitting, BP Cuff Size: Adult)   Pulse 78   Resp 16   Ht 1.753 m (5' 9\")   Wt 75.8 kg (167 lb)   SpO2 96%     Current medications as of today   Current Outpatient Medications   Medication Sig Dispense Refill   • omeprazole (PRILOSEC) 40 MG delayed-release capsule Take 1 capsule (40 mg) by mouth every morning before breakfast. 90 Capsule 3   • amLODIPine (NORVASC) 2.5 MG Tab Take 1 tablet (2.5 mg) by mouth once daily for 30 days. 30 Tablet 2   • " cetirizine (ZYRTEC) 10 MG Tab Take 1 tablet (10 mg) by mouth daily for 90 days. 90 Tablet 1   • cabergoline (DOSTINEX) 0.5 MG tablet Take 1 Tablet by mouth every 72 hours. 24 Tablet 3   • MULTIPLE VITAMINS-MINERALS ER PO Take 50 mg by mouth every day.     • bromocriptine (PARLODEL) 2.5 MG Tab Take 1 Tablet by mouth every day.     • escitalopram (LEXAPRO) 10 MG Tab Take 1 Tablet by mouth every day.     • Zinc Sulfate (ZINC 15 PO) zinc     • ZINC GLUCONATE PO Take  by mouth.       No current facility-administered medications for this visit.         Physical Exam:   Gen:           Alert and oriented, No apparent distress. Mood and affect appropriate, normal interaction with examiner.  Eyes:          PERRL, EOM intact, sclere white, conjunctive moist.  Ears:          Not examined.   Hearing:     Grossly intact.  Nose:          Normal, no lesions or deformities.  Dentition:    mask  Oropharynx:   mask  Mallampati Classification: mask  Neck:        Supple, trachea midline, no masses.  Respiratory Effort: No intercostal retractions or use of accessory muscles.   Lung Auscultation:      Clear to auscultation bilaterally; no rales, rhonchi or wheezing.  CV:            Regular rate and rhythm. No murmurs, rubs or gallops.  Abd:           Not examined.   Lymphadenopathy: Not examined.  Gait and Station: Normal.  Digits and Nails: No clubbing, cyanosis, petechiae, or nodes.   Cranial Nerves: II-XII grossly intact.  Skin:        No rashes, lesions or ulcers noted.               Ext:           No cyanosis or edema.      Assessment:  1. Obstructive sleep apnea  DME Mask and Supplies      2. BMI 24.0-24.9, adult        3. Nonsmoker            Immunizations:    Flu:11/3/22  Pneumovax 23:not due  Prevnar 13:not due  PCV 20: not due  COVID-19: 5/13/21, booster recommended    Plan:  1. JUAN MANUEL is not well controlled at this time due to intermittent use.  Reviewed the importance of consistent nightly use for 6 to 9 hours ideally for maximal  benefit.  Reviewed the need to set alarms and to go directly to bed once he comes home to avoid falling asleep on the couch not putting his device on.  Continue CPAP 11 cm nightly  If fatigue does not improve with consistent use recommend performing overnight oximetry  2. Follow-up with primary care for other health concerns  3. Follow-up in 2 months for compliance check, sooner if needed.    Please note that this dictation was created using voice recognition software. I have made every reasonable attempt to correct obvious errors, but it is possible there are errors of grammar and possibly content that I did not discover before finalizing the note.          Electronically signed by JUAN ANTONIO Elizabeth  on 11/10/22    Caio Pérez M.D.  [address] [address] [address]  Via Fax: [phone]

## 2022-12-10 PROCEDURE — RXMED WILLOW AMBULATORY MEDICATION CHARGE: Performed by: FAMILY MEDICINE

## 2022-12-16 ENCOUNTER — PHARMACY VISIT (OUTPATIENT)
Dept: PHARMACY | Facility: MEDICAL CENTER | Age: 42
End: 2022-12-16
Payer: COMMERCIAL

## 2022-12-16 PROCEDURE — RXMED WILLOW AMBULATORY MEDICATION CHARGE: Performed by: NURSE PRACTITIONER

## 2022-12-19 PROCEDURE — RXMED WILLOW AMBULATORY MEDICATION CHARGE: Performed by: FAMILY MEDICINE

## 2022-12-20 ENCOUNTER — PHARMACY VISIT (OUTPATIENT)
Dept: PHARMACY | Facility: MEDICAL CENTER | Age: 42
End: 2022-12-20
Payer: COMMERCIAL

## 2022-12-29 ENCOUNTER — PHARMACY VISIT (OUTPATIENT)
Dept: PHARMACY | Facility: MEDICAL CENTER | Age: 42
End: 2022-12-29
Payer: COMMERCIAL

## 2023-01-09 PROCEDURE — RXMED WILLOW AMBULATORY MEDICATION CHARGE: Performed by: FAMILY MEDICINE

## 2023-01-12 ENCOUNTER — PHARMACY VISIT (OUTPATIENT)
Dept: PHARMACY | Facility: MEDICAL CENTER | Age: 43
End: 2023-01-12
Payer: COMMERCIAL

## 2023-01-12 PROCEDURE — RXMED WILLOW AMBULATORY MEDICATION CHARGE: Performed by: DENTIST

## 2023-01-12 RX ORDER — AMOXICILLIN 500 MG/1
CAPSULE ORAL
Qty: 28 CAPSULE | Refills: 0 | Status: SHIPPED | OUTPATIENT
Start: 2023-01-12 | End: 2024-02-08

## 2023-01-24 ENCOUNTER — OFFICE VISIT (OUTPATIENT)
Dept: SLEEP MEDICINE | Facility: MEDICAL CENTER | Age: 43
End: 2023-01-24
Payer: COMMERCIAL

## 2023-01-24 VITALS
DIASTOLIC BLOOD PRESSURE: 72 MMHG | SYSTOLIC BLOOD PRESSURE: 130 MMHG | BODY MASS INDEX: 24.14 KG/M2 | HEART RATE: 81 BPM | RESPIRATION RATE: 16 BRPM | HEIGHT: 69 IN | OXYGEN SATURATION: 93 % | WEIGHT: 163 LBS

## 2023-01-24 DIAGNOSIS — Z78.9 NONSMOKER: ICD-10-CM

## 2023-01-24 DIAGNOSIS — G47.33 OBSTRUCTIVE SLEEP APNEA: ICD-10-CM

## 2023-01-24 PROCEDURE — 99213 OFFICE O/P EST LOW 20 MIN: CPT | Performed by: NURSE PRACTITIONER

## 2023-01-24 ASSESSMENT — PATIENT HEALTH QUESTIONNAIRE - PHQ9: CLINICAL INTERPRETATION OF PHQ2 SCORE: 0

## 2023-01-24 ASSESSMENT — FIBROSIS 4 INDEX: FIB4 SCORE: 1.11

## 2023-01-24 NOTE — PATIENT INSTRUCTIONS
Look at [employer] for different chin straps or request one from medical equipment company to try

## 2023-01-24 NOTE — PROGRESS NOTES
Chief Complaint   Patient presents with    Follow-Up     Apnea // last seen 11/10/2022       HPI:  Mert Carl II is a 42 y.o. year old male here today for follow-up on JUAN MANUEL.  Last OV 11/10/22     Currently using CPAP @ 11cm H20 nightly; RESPIRONICS; device obtained .  Compliance notes avg use in the last 30 days to be 4hrs, with 97% mask fit and overall AHI 7.2/hr. Reviewed with patient.  He notes not using the device for 1 week when he underwent dental work and required treatment for infection.  He has now resumed therapy.  He continues to do shift work and working 3 night shifts and going to bed around 4 AM and sleeping till the afternoon hours.  He denies waking with headaches.  He is also adjusting to having a  at home.  He reports his mask fitting well but having ongoing jaw pressure to both sides even prior to his recent dental work that he believes may be from the mask.  He feels his teeth hurt on his back molars.  He denies cardiac or respiratory symptoms otherwise.    SLEEP HX:  PSG split night 21 noted sleep efficiency of 50%, overall AHI 51.4/h and REM AHI 52.71/h with supine AHI 57.01/h.  39% events were central.  PLMS index of 23.1/h.  Mean SPO2 97% with O2 mikal of 89%.  Patient was titrated on CPAP and BiPAP with best tolerated pressure BiPAP 18/13 cm.  AHI improved to 3.53/h and O2 mikal of 95%.  Treatment emergent central apneas noted.  Overall incomplete titration study.    PFT notes normal spirometry.    ROS: As per HPI and otherwise negative if not stated.    Past Medical History:   Diagnosis Date    Chickenpox        History reviewed. No pertinent surgical history.    Family History   Problem Relation Age of Onset    Sleep Apnea Neg Hx        Social History     Socioeconomic History    Marital status:      Spouse name: Not on file    Number of children: Not on file    Years of education: Not on file    Highest education level: Not on file   Occupational History  "   Not on file   Tobacco Use    Smoking status: Never    Smokeless tobacco: Never   Vaping Use    Vaping Use: Every day    Substances: Nicotine   Substance and Sexual Activity    Alcohol use: Yes     Comment: 2-3 a day     Drug use: Not Currently    Sexual activity: Not on file   Other Topics Concern    Not on file   Social History Narrative    Not on file     Social Determinants of Health     Financial Resource Strain: Not on file   Food Insecurity: Not on file   Transportation Needs: Not on file   Physical Activity: Not on file   Stress: Not on file   Social Connections: Not on file   Intimate Partner Violence: Not on file   Housing Stability: Not on file       Allergies as of 01/24/2023    (No Known Allergies)        Vitals:  /72 (BP Location: Left arm, Patient Position: Sitting, BP Cuff Size: Adult)   Pulse 81   Resp 16   Ht 1.753 m (5' 9\")   Wt 73.9 kg (163 lb)   SpO2 93%     Current medications as of today   Current Outpatient Medications   Medication Sig Dispense Refill    amoxicillin (AMOXIL) 500 MG Cap Take 1 capsule by mouth 4 times a day until gone. 28 Capsule 0    escitalopram (LEXAPRO) 10 MG Tab Take 1 tablet (10 mg) by mouth once daily for 90 days. 90 Tablet 2    omeprazole (PRILOSEC) 40 MG delayed-release capsule Take 1 capsule (40 mg) by mouth every morning before breakfast. 90 Capsule 3    amLODIPine (NORVASC) 2.5 MG Tab Take 1 tablet (2.5 mg) by mouth once daily for 30 days. 30 Tablet 2    cetirizine (ZYRTEC) 10 MG Tab Take 1 tablet (10 mg) by mouth daily for 90 days. 90 Tablet 1    cabergoline (DOSTINEX) 0.5 MG tablet Take 1 Tablet by mouth every 72 hours. 24 Tablet 3    MULTIPLE VITAMINS-MINERALS ER PO Take 50 mg by mouth every day.      bromocriptine (PARLODEL) 2.5 MG Tab Take 1 Tablet by mouth every day.      escitalopram (LEXAPRO) 10 MG Tab Take 1 Tablet by mouth every day.      Zinc Sulfate (ZINC 15 PO) zinc      ZINC GLUCONATE PO Take  by mouth.       No current " facility-administered medications for this visit.         Physical Exam:   Gen:           Alert and oriented, No apparent distress. Mood and affect appropriate, normal interaction with examiner.  Eyes:          PERRL, EOM intact, sclere white, conjunctive moist.  Ears:          Not examined.   Hearing:     Grossly intact.  Nose:          Normal, no lesions or deformities.  Dentition:    mask  Oropharynx:   mask  Mallampati Classification: mask  Neck:        Supple, trachea midline, no masses.  Respiratory Effort: No intercostal retractions or use of accessory muscles.   Lung Auscultation:      Clear to auscultation bilaterally; no rales, rhonchi or wheezing.  CV:            Regular rate and rhythm. No murmurs, rubs or gallops.  Abd:           Not examined.   Lymphadenopathy: Not examined.  Gait and Station: Normal.  Digits and Nails: No clubbing, cyanosis, petechiae, or nodes.   Cranial Nerves: II-XII grossly intact.  Skin:        No rashes, lesions or ulcers noted.               Ext:           No cyanosis or edema.      Assessment:  1. Obstructive sleep apnea  DME Mask and Supplies      2. BMI 24.0-24.9, adult        3. Nonsmoker            Immunizations:    Flu:11/3/22  Pneumovax 23:not due  Prevnar 13:not due  PCV 20: not due  COVID-19: 5/13/21    Plan:  JUAN MANUEL is improved on therapy.  He will continue to benefit from CPAP 11 cm.  We will pursue using a chinstrap to elevate his jaw and also a possible mask fit for a similar model to see if this improves his jaw pressure that is occurring during mask use.  Overall his mask is fitting well per his CPAP device.  He is tolerating pressure.  He continues to find benefit from therapy and feels more rested.  He is working on continued consistent use and longer duration of sleep with device.   DME mask/supplies; we will switch DME companies to have mask fit performed and for ongoing supplies  Discussed sleep hygiene  Follow-up primary care further health concerns  Follow-up  in 3 months for compliance check, sooner if needed.    Please note that this dictation was created using voice recognition software. I have made every reasonable attempt to correct obvious errors, but it is possible there are errors of grammar and possibly content that I did not discover before finalizing the note.

## 2023-01-24 NOTE — LETTER
JUAN ANTONIO Elizabeth  [facility] Pulmonary Medicine   [address] [place] [address]  Phone: [phone] - Fax:             Encounter Date: 2023  Provider: JUAN ANTONIO Elizabeth  Location of Care: [facility] PULMONARY MEDICINE  [address] [facility] [address]      Patient:   Mert Carl II   MR Number: [id number]   YOB: 1980     PROGRESS NOTE:  Chief Complaint   Patient presents with   • Follow-Up     Apnea // last seen 11/10/2022       HPI:  Mert Carl II is a 42 y.o. year old male here today for follow-up on JUAN MANUEL.  Last OV 11/10/22     Currently using CPAP @ 11cm H20 nightly; RESPIRONICS; device obtained .  Compliance notes avg use in the last 30 days to be 4hrs, with 97% mask fit and overall AHI 7.2/hr. Reviewed with patient.  He notes not using the device for 1 week when he underwent dental work and required treatment for infection.  He has now resumed therapy.  He continues to do shift work and working 3 night shifts and going to bed around 4 AM and sleeping till the afternoon hours.  He denies waking with headaches.  He is also adjusting to having a  at home.  He reports his mask fitting well but having ongoing jaw pressure to both sides even prior to his recent dental work that he believes may be from the mask.  He feels his teeth hurt on his back molars.  He denies cardiac or respiratory symptoms otherwise.    SLEEP HX:  PSG split night 21 noted sleep efficiency of 50%, overall AHI 51.4/h and REM AHI 52.71/h with supine AHI 57.01/h.  39% events were central.  PLMS index of 23.1/h.  Mean SPO2 97% with O2 mikal of 89%.  Patient was titrated on CPAP and BiPAP with best tolerated pressure BiPAP 18/13 cm.  AHI improved to 3.53/h and O2 mikal of 95%.  Treatment emergent central apneas noted.  Overall incomplete titration study.    PFT notes normal spirometry.    ROS: As per  "HPI and otherwise negative if not stated.    Past Medical History:   Diagnosis Date   • Chickenpox        History reviewed. No pertinent surgical history.    Family History   Problem Relation Age of Onset   • Sleep Apnea Neg Hx        Social History     Socioeconomic History   • Marital status:      Spouse name: Not on file   • Number of children: Not on file   • Years of education: Not on file   • Highest education level: Not on file   Occupational History   • Not on file   Tobacco Use   • Smoking status: Never   • Smokeless tobacco: Never   Vaping Use   • Vaping Use: Every day   • Substances: Nicotine   Substance and Sexual Activity   • Alcohol use: Yes     Comment: 2-3 a day    • Drug use: Not Currently   • Sexual activity: Not on file   Other Topics Concern   • Not on file   Social History Narrative   • Not on file     Social Determinants of Health     Financial Resource Strain: Not on file   Food Insecurity: Not on file   Transportation Needs: Not on file   Physical Activity: Not on file   Stress: Not on file   Social Connections: Not on file   Intimate Partner Violence: Not on file   Housing Stability: Not on file       Allergies as of 01/24/2023   • (No Known Allergies)        Vitals:  /72 (BP Location: Left arm, Patient Position: Sitting, BP Cuff Size: Adult)   Pulse 81   Resp 16   Ht 1.753 m (5' 9\")   Wt 73.9 kg (163 lb)   SpO2 93%     Current medications as of today   Current Outpatient Medications   Medication Sig Dispense Refill   • amoxicillin (AMOXIL) 500 MG Cap Take 1 capsule by mouth 4 times a day until gone. 28 Capsule 0   • escitalopram (LEXAPRO) 10 MG Tab Take 1 tablet (10 mg) by mouth once daily for 90 days. 90 Tablet 2   • omeprazole (PRILOSEC) 40 MG delayed-release capsule Take 1 capsule (40 mg) by mouth every morning before breakfast. 90 Capsule 3   • amLODIPine (NORVASC) 2.5 MG Tab Take 1 tablet (2.5 mg) by mouth once daily for 30 days. 30 Tablet 2   • cetirizine (ZYRTEC) 10 " MG Tab Take 1 tablet (10 mg) by mouth daily for 90 days. 90 Tablet 1   • cabergoline (DOSTINEX) 0.5 MG tablet Take 1 Tablet by mouth every 72 hours. 24 Tablet 3   • MULTIPLE VITAMINS-MINERALS ER PO Take 50 mg by mouth every day.     • bromocriptine (PARLODEL) 2.5 MG Tab Take 1 Tablet by mouth every day.     • escitalopram (LEXAPRO) 10 MG Tab Take 1 Tablet by mouth every day.     • Zinc Sulfate (ZINC 15 PO) zinc     • ZINC GLUCONATE PO Take  by mouth.       No current facility-administered medications for this visit.         Physical Exam:   Gen:           Alert and oriented, No apparent distress. Mood and affect appropriate, normal interaction with examiner.  Eyes:          PERRL, EOM intact, sclere white, conjunctive moist.  Ears:          Not examined.   Hearing:     Grossly intact.  Nose:          Normal, no lesions or deformities.  Dentition:    mask  Oropharynx:   mask  Mallampati Classification: mask  Neck:        Supple, trachea midline, no masses.  Respiratory Effort: No intercostal retractions or use of accessory muscles.   Lung Auscultation:      Clear to auscultation bilaterally; no rales, rhonchi or wheezing.  CV:            Regular rate and rhythm. No murmurs, rubs or gallops.  Abd:           Not examined.   Lymphadenopathy: Not examined.  Gait and Station: Normal.  Digits and Nails: No clubbing, cyanosis, petechiae, or nodes.   Cranial Nerves: II-XII grossly intact.  Skin:        No rashes, lesions or ulcers noted.               Ext:           No cyanosis or edema.      Assessment:  1. Obstructive sleep apnea  DME Mask and Supplies      2. BMI 24.0-24.9, adult        3. Nonsmoker            Immunizations:    Flu:11/3/22  Pneumovax 23:not due  Prevnar 13:not due  PCV 20: not due  COVID-19: 5/13/21    Plan:  1. JUAN MANUEL is improved on therapy.  He will continue to benefit from CPAP 11 cm.  We will pursue using a chinstrap to elevate his jaw and also a possible mask fit for a similar model to see if this  improves his jaw pressure that is occurring during mask use.  Overall his mask is fitting well per his CPAP device.  He is tolerating pressure.  He continues to find benefit from therapy and feels more rested.  He is working on continued consistent use and longer duration of sleep with device.   DME mask/supplies; we will switch DME companies to have mask fit performed and for ongoing supplies  2. Discussed sleep hygiene  3. Follow-up primary care further health concerns  4. Follow-up in 3 months for compliance check, sooner if needed.    Please note that this dictation was created using voice recognition software. I have made every reasonable attempt to correct obvious errors, but it is possible there are errors of grammar and possibly content that I did not discover before finalizing the note.          Electronically signed by COLLIN Elizabeth.R.N.  on 01/24/23    Caio Pérez M.D.  [address] [facility] [address]  [place] [address]  Via Fax: [phone]

## 2023-02-10 ENCOUNTER — HOSPITAL ENCOUNTER (OUTPATIENT)
Dept: LAB | Facility: MEDICAL CENTER | Age: 43
End: 2023-02-10
Payer: COMMERCIAL

## 2023-02-10 LAB
25(OH)D3 SERPL-MCNC: 57 NG/ML (ref 30–100)
ALBUMIN SERPL BCP-MCNC: 4.3 G/DL (ref 3.2–4.9)
ALBUMIN/GLOB SERPL: 1.7 G/DL
ALP SERPL-CCNC: 60 U/L (ref 30–99)
ALT SERPL-CCNC: 19 U/L (ref 2–50)
ANION GAP SERPL CALC-SCNC: 9 MMOL/L (ref 7–16)
AST SERPL-CCNC: 23 U/L (ref 12–45)
BILIRUB SERPL-MCNC: 0.3 MG/DL (ref 0.1–1.5)
BUN SERPL-MCNC: 11 MG/DL (ref 8–22)
CALCIUM ALBUM COR SERPL-MCNC: 9.1 MG/DL (ref 8.5–10.5)
CALCIUM SERPL-MCNC: 9.3 MG/DL (ref 8.5–10.5)
CHLORIDE SERPL-SCNC: 102 MMOL/L (ref 96–112)
CO2 SERPL-SCNC: 26 MMOL/L (ref 20–33)
CREAT SERPL-MCNC: 0.8 MG/DL (ref 0.5–1.4)
GFR SERPLBLD CREATININE-BSD FMLA CKD-EPI: 113 ML/MIN/1.73 M 2
GLOBULIN SER CALC-MCNC: 2.6 G/DL (ref 1.9–3.5)
GLUCOSE SERPL-MCNC: 93 MG/DL (ref 65–99)
POTASSIUM SERPL-SCNC: 4 MMOL/L (ref 3.6–5.5)
PROLACTIN SERPL-MCNC: 2.67 NG/ML (ref 2.1–17.7)
PROT SERPL-MCNC: 6.9 G/DL (ref 6–8.2)
SODIUM SERPL-SCNC: 137 MMOL/L (ref 135–145)
T4 FREE SERPL-MCNC: 1.05 NG/DL (ref 0.93–1.7)
TSH SERPL DL<=0.005 MIU/L-ACNC: 3.69 UIU/ML (ref 0.38–5.33)

## 2023-02-10 PROCEDURE — 82306 VITAMIN D 25 HYDROXY: CPT

## 2023-02-10 PROCEDURE — 84439 ASSAY OF FREE THYROXINE: CPT

## 2023-02-10 PROCEDURE — 84443 ASSAY THYROID STIM HORMONE: CPT

## 2023-02-10 PROCEDURE — 36415 COLL VENOUS BLD VENIPUNCTURE: CPT

## 2023-02-10 PROCEDURE — 84146 ASSAY OF PROLACTIN: CPT

## 2023-02-10 PROCEDURE — 80053 COMPREHEN METABOLIC PANEL: CPT

## 2023-02-24 ENCOUNTER — TELEMEDICINE (OUTPATIENT)
Dept: ENDOCRINOLOGY | Facility: MEDICAL CENTER | Age: 43
End: 2023-02-24
Payer: COMMERCIAL

## 2023-02-24 DIAGNOSIS — E55.9 VITAMIN D DEFICIENCY: ICD-10-CM

## 2023-02-24 DIAGNOSIS — E22.1 HYPERPROLACTINEMIA (HCC): ICD-10-CM

## 2023-02-24 PROCEDURE — 99214 OFFICE O/P EST MOD 30 MIN: CPT | Mod: 95

## 2023-02-24 NOTE — PROGRESS NOTES
Chief Complaint:Follow-up on the following conditions  Patient was presented for a telehealth consultation via secure and encrypted videoconferencing technology. This encounter was conducted via Zoom . Verbal consent was obtained. Patient's identity was verified.   Subjective:    Mert Carl II is a 41 y.o. male     Hyperprolactinemia:  He was prescribed Bromocriptine 2.5 mg daily by his fertility clinic initially  On his last appointment with Michelle Robert is switching to cabergoline 0.5 mg twice a week-since April of 2022  Patient has tolerated this therapy well and again is not symptomatic in any form.    He denies headache, denies vision difficulties, denies frequent urination, denies increased thirst, denies galactorrhea.  Last visual field checks was last year-patient is aware that this has to be done every year     Latest Reference Range & Units 02/10/23 08:21   Prolactin 2.10 - 17.70 ng/mL 2.67       He uses a C pap, he works nights-reports fatigue,   Denies constipation, dry skin, losing hair, dry skin   Reports fatigue    Latest Reference Range & Units 02/10/23 08:21   TSH 0.380 - 5.330 uIU/mL 3.690   Free T-4 0.93 - 1.70 ng/dL 1.05       History:  He was first diagnosed with a pituitary tumor in 11/05/2021.    Presenting symptoms which led to the investigation of the pituitary included hypogonadism as patient is trying to conceive with his spouse.    Initial MRI of the pituitary on 11/05/2021 revealed There is an approximately 3 to 4 mm sized questionable hypoenhancing area in the posterior portion of the pituitary gland. In view of hyperprolactinemia, this is suspicious for pituitary microadenoma..      Pituitary function testing revealed: Prolactin level elevated at 22.6, total testosterone 300.    Visual field testing revealed: NA.    Prior pituitary surgery: No.    Prior pituitary radiation therapy: No.    2.  Vitamin D deficiency:  Currently taking 4000iUS daily    Latest Reference  Range & Units 02/10/23 08:21   25-Hydroxy   Vitamin D 25 30 - 100 ng/mL 57      Latest Reference Range & Units 5/16/22 13:54   25-Hydroxy   Vitamin D 25 30 - 80 ng/mL 19 (L)       Patient's medications, allergies, and social histories were reviewed and updated as appropriate.    ROS:     CONS:     No fever, no chills, no weight loss, no fatigue   EYES:      No diplopia, no blurry vision, no redness of eyes, no swelling of eyelids   ENT:    No hearing loss, No ear pain, No sore throat, no dysphagia, no neck swelling   CV:     No chest pain, no palpitations, no claudication, no orthopnea, no PND   PULM:    No SOB, no cough, no hemoptysis, no wheezing    GI:   No nausea, no vomiting, no diarrhea, no constipation, no bloody stools   :  Passing urine well, no dysuria, no hematuria   ENDO:   No polyuria, no polydipsia, no heat intolerance, no cold intolerance   NEURO: No headaches, no dizziness, no convulsions, no tremors   MUSC:  No joint swellings, no arthralgias, no myalgias, no weakness   SKIN:   No rash, no ulcers, no dry skin   PSYCH:   No depression, no anxiety, no difficulty sleeping       Past Medical History:  Patient Active Problem List    Diagnosis Date Noted    Obstructive sleep apnea 01/24/2023    Depression 04/22/2022    Pituitary adenoma ([facility]) 11/10/2021    Anxiety 09/16/2021    Hyperprolactinemia (HCC) 09/01/2021    Hypogonadism 09/01/2021    Absence of testicle in scrotum 08/16/2021    Male infertility 08/16/2021       Past Surgical History:  No past surgical history on file.     Allergies:  Patient has no known allergies.     Current Medications:    Current Outpatient Medications:     amLODIPine (NORVASC) 2.5 MG Tab, Take 1 Tablet by mouth every day., Disp: 90 Tablet, Rfl: 2    cetirizine (ZYRTEC) 10 MG Tab, Take 1 tablet (10 mg) by mouth daily for 90 days., Disp: 90 Tablet, Rfl: 1    amoxicillin (AMOXIL) 500 MG Cap, Take 1 capsule by mouth 4 times a day until gone., Disp: 28 Capsule, Rfl: 0     escitalopram (LEXAPRO) 10 MG Tab, Take 1 tablet (10 mg) by mouth once daily for 90 days., Disp: 90 Tablet, Rfl: 2    omeprazole (PRILOSEC) 40 MG delayed-release capsule, Take 1 capsule (40 mg) by mouth every morning before breakfast., Disp: 90 Capsule, Rfl: 3    cabergoline (DOSTINEX) 0.5 MG tablet, Take 1 Tablet by mouth every 72 hours., Disp: 24 Tablet, Rfl: 3    MULTIPLE VITAMINS-MINERALS ER PO, Take 50 mg by mouth every day., Disp: , Rfl:     bromocriptine (PARLODEL) 2.5 MG Tab, Take 1 Tablet by mouth every day., Disp: , Rfl:     escitalopram (LEXAPRO) 10 MG Tab, Take 1 Tablet by mouth every day., Disp: , Rfl:     Zinc Sulfate (ZINC 15 PO), zinc, Disp: , Rfl:     ZINC GLUCONATE PO, Take  by mouth., Disp: , Rfl:     Social History:  Social History     Socioeconomic History    Marital status:      Spouse name: Not on file    Number of children: Not on file    Years of education: Not on file    Highest education level: Not on file   Occupational History    Not on file   Tobacco Use    Smoking status: Never    Smokeless tobacco: Never   Vaping Use    Vaping Use: Every day    Substances: Nicotine   Substance and Sexual Activity    Alcohol use: Yes     Comment: 2-3 a day     Drug use: Not Currently    Sexual activity: Not on file   Other Topics Concern    Not on file   Social History Narrative    Not on file     Social Determinants of Health     Financial Resource Strain: Not on file   Food Insecurity: Not on file   Transportation Needs: Not on file   Physical Activity: Not on file   Stress: Not on file   Social Connections: Not on file   Intimate Partner Violence: Not on file   Housing Stability: Not on file        Family History:   Family History   Problem Relation Age of Onset    Sleep Apnea Neg Hx          PHYSICAL EXAM:   Vital signs: Virtual visit   GENERAL: Well-developed, well-nourished  in no apparent distress.       ASSESSMENT/PLAN:   1. Hyperprolactinemia (HCC)  Stable  Patient is asymptomatic and  stable  We will repeat an MRI if patient develops any symptoms of headaches, peripheral visual difficulties, galactorrhea  Continue cabergoline 0.5 mg twice a week  We will have the discussion to titrate his cabergoline down at next appt and monitor prolactin levels every 3 months x 4 then every 6 months x 2, then every year  - TSH; Future  - FREE THYROXINE; Future  - Comp Metabolic Panel; Future  - PROLACTIN; Future  - IGF-1 SOMATOMEDIN; Future    2. Vitamin D deficiency  Unstable but improving  Continue regimen-HPI  - VITAMIN D,25 HYDROXY (DEFICIENCY); Future       Disposition: Make an appointment to follow-up in 3 months  Do your blood work 2 weeks prior to next appointment    Thank you kindly for allowing me to participate in the endocrine care plan for this patient.    Juan Alberto Hoffman, A.P.R.N.  02/24/2023    CC:   Caio Pérez M.D.

## 2023-03-10 ENCOUNTER — PHARMACY VISIT (OUTPATIENT)
Dept: PHARMACY | Facility: MEDICAL CENTER | Age: 43
End: 2023-03-10
Payer: COMMERCIAL

## 2023-03-10 PROCEDURE — RXMED WILLOW AMBULATORY MEDICATION CHARGE: Performed by: FAMILY MEDICINE

## 2023-04-15 PROCEDURE — RXMED WILLOW AMBULATORY MEDICATION CHARGE: Performed by: FAMILY MEDICINE

## 2023-04-15 PROCEDURE — RXMED WILLOW AMBULATORY MEDICATION CHARGE: Performed by: NURSE PRACTITIONER

## 2023-04-24 ENCOUNTER — PHARMACY VISIT (OUTPATIENT)
Dept: PHARMACY | Facility: MEDICAL CENTER | Age: 43
End: 2023-04-24
Payer: COMMERCIAL

## 2023-05-02 ENCOUNTER — OFFICE VISIT (OUTPATIENT)
Dept: SLEEP MEDICINE | Facility: MEDICAL CENTER | Age: 43
End: 2023-05-02
Attending: NURSE PRACTITIONER
Payer: COMMERCIAL

## 2023-05-02 VITALS
SYSTOLIC BLOOD PRESSURE: 130 MMHG | HEART RATE: 69 BPM | RESPIRATION RATE: 16 BRPM | DIASTOLIC BLOOD PRESSURE: 70 MMHG | WEIGHT: 173 LBS | BODY MASS INDEX: 25.62 KG/M2 | HEIGHT: 69 IN | OXYGEN SATURATION: 93 %

## 2023-05-02 DIAGNOSIS — Z78.9 NONSMOKER: ICD-10-CM

## 2023-05-02 DIAGNOSIS — G47.33 OBSTRUCTIVE SLEEP APNEA: ICD-10-CM

## 2023-05-02 PROCEDURE — 99212 OFFICE O/P EST SF 10 MIN: CPT | Performed by: NURSE PRACTITIONER

## 2023-05-02 PROCEDURE — 99213 OFFICE O/P EST LOW 20 MIN: CPT | Performed by: NURSE PRACTITIONER

## 2023-05-02 ASSESSMENT — FIBROSIS 4 INDEX: FIB4 SCORE: 1.055312375804584123

## 2023-05-02 NOTE — LETTER
JUAN ANTONIO Elizabeth  [facility] Pulmonary Medicine - Operated by [facility]   [address], [address] [address]  Phone: [phone] - Fax: [phone]           Encounter Date: 5/2/2023  Provider: JUAN ANTONIO Elizabeth  Location of Care: [facility] PULMONARY MEDICINE - [facility]PERATED BY [facility]  [address] [facility] [place] [address] [phone]      Patient:   Mert Carl II   MR Number: [id number]   YOB: 1980     PROGRESS NOTE:  Chief Complaint   Patient presents with   • Follow-Up     Apnea //  last seen 1/24/2023       HPI:  Mert Carl II is a 42 y.o. year old male here today for follow-up on JUAN MANUEL; compliance check.  Last OV 1/24/23     Mask fit performed since last OV.    Currently using CPAP @ 11cm H20 nightly; RESPIRONICS; device obtained 2021.  Compliance report 4/2/2023 through 5/1/2023 indicates 100% compliance, average nightly is 4 hours 53 minutes, minimal mask leak of 6 minutes per night, with a reduced AHI of 4.2/h.  Reviewed details with patient. He tolerates mask and pressure well, no AM headaches or fogginess. He notes he can fall asleep in the evenings if in front of the TV. He goes to bed between 10pm-MN. Reviewed going to bed earlier if sleepy. He likes the new mask but due to deviated septum continue to have dry mouth at night. He generally wakes by 4:30am and sometimes able to go back to bed briefly if baby goes back to sleep.  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   He denies cardiac or respiratory symptoms. He feels better on therapy.    SLEEP HX:  PSG split night 2/27/21 noted sleep efficiency of 50%, overall AHI 51.4/h and REM AHI 52.71/h with supine AHI 57.01/h.  39% events were central.  PLMS index of 23.1/h.  Mean SPO2 97% with O2 mikal of 89%.  Patient was titrated on CPAP and BiPAP with best  "tolerated pressure BiPAP 18/13 cm.  AHI improved to 3.53/h and O2 mikal of 95%.  Treatment emergent central apneas noted.  Overall incomplete titration study.         ROS: As per HPI and otherwise negative if not stated.    Past Medical History:   Diagnosis Date   • Chickenpox        History reviewed. No pertinent surgical history.    Family History   Problem Relation Age of Onset   • Sleep Apnea Neg Hx        Social History     Socioeconomic History   • Marital status:      Spouse name: Not on file   • Number of children: Not on file   • Years of education: Not on file   • Highest education level: Not on file   Occupational History   • Not on file   Tobacco Use   • Smoking status: Never   • Smokeless tobacco: Never   Vaping Use   • Vaping Use: Every day   • Substances: Nicotine   Substance and Sexual Activity   • Alcohol use: Yes     Comment: 2-3 a day    • Drug use: Not Currently   • Sexual activity: Not on file   Other Topics Concern   • Not on file   Social History Narrative   • Not on file     Social Determinants of Health     Financial Resource Strain: Not on file   Food Insecurity: Not on file   Transportation Needs: Not on file   Physical Activity: Not on file   Stress: Not on file   Social Connections: Not on file   Intimate Partner Violence: Not on file   Housing Stability: Not on file       Allergies as of 05/02/2023   • (No Known Allergies)        Vitals:  /70 (BP Location: Left arm, Patient Position: Sitting, BP Cuff Size: Adult)   Pulse 69   Resp 16   Ht 1.753 m (5' 9\")   Wt 78.5 kg (173 lb)   SpO2 93%     Current medications as of today   Current Outpatient Medications   Medication Sig Dispense Refill   • amLODIPine (NORVASC) 2.5 MG Tab Take 1 Tablet by mouth every day. 90 Tablet 2   • cetirizine (ZYRTEC) 10 MG Tab Take 1 tablet (10 mg) by mouth daily for 90 days. 90 Tablet 1   • amoxicillin (AMOXIL) 500 MG Cap Take 1 capsule by mouth 4 times a day until gone. 28 Capsule 0   • " escitalopram (LEXAPRO) 10 MG Tab Take 1 tablet (10 mg) by mouth once daily for 90 days. 90 Tablet 2   • omeprazole (PRILOSEC) 40 MG delayed-release capsule Take 1 capsule (40 mg) by mouth every morning before breakfast. 90 Capsule 3   • cabergoline (DOSTINEX) 0.5 MG tablet Take 1 Tablet by mouth every 72 hours. 24 Tablet 3   • MULTIPLE VITAMINS-MINERALS ER PO Take 50 mg by mouth every day.     • bromocriptine (PARLODEL) 2.5 MG Tab Take 1 Tablet by mouth every day.     • escitalopram (LEXAPRO) 10 MG Tab Take 1 Tablet by mouth every day.     • Zinc Sulfate (ZINC 15 PO) zinc     • ZINC GLUCONATE PO Take  by mouth.       No current facility-administered medications for this visit.         Physical Exam:   Gen:           Alert and oriented, No apparent distress. Mood and affect appropriate, normal interaction with examiner.  Eyes:          PERRL, EOM intact, sclere white, conjunctive moist.  Ears:          Not examined.   Hearing:     Grossly intact.  Nose:          Normal, no lesions or deformities.  Dentition:    Not examined.   Oropharynx:   Not examined.   Mallampati Classification: Not examined.   Neck:        Supple, trachea midline, no masses.  Respiratory Effort: No intercostal retractions or use of accessory muscles.   Lung Auscultation:      Clear to auscultation bilaterally; no rales, rhonchi or wheezing.  CV:            Regular rate and rhythm. No murmurs, rubs or gallops.  Abd:           Not examined.   Lymphadenopathy: Not examined.  Gait and Station: Normal.  Digits and Nails: No clubbing, cyanosis, petechiae, or nodes.   Cranial Nerves: II-XII grossly intact.  Skin:        No rashes, lesions or ulcers noted.               Ext:           No cyanosis or edema.      Assessment:  1. Obstructive sleep apnea        2. BMI 25.0-25.9,adult        3. Nonsmoker            Immunizations:    Flu:11/3/22  Pneumovax 23:not due  Prevnar 13:not due  PCV 20: not due  COVID-19: 5/13/21    Plan:  JUAN MANUEL is well controlled.  Continue CPAP 11cm with new mask. Reviewed sleep hygiene and the need to go to bed earlier than fall asleep on cough. Remedies for dry mouth discussed including mouth tape and Xylimelts for trial. Due to deviated septum, he may want to have ENT evaluate for possible surgery in the future to correct since this would improve his airway and therefore sleep apnea.  Follow up with PCP for other health concerns  Follow up in 6 months for compliance check, sooner if needed.    Please note that this dictation was created using voice recognition software. I have made every reasonable attempt to correct obvious errors, but it is possible there are errors of grammar and possibly content that I did not discover before finalizing the note.        Electronically signed by NGHIA ElizabethRMannyNManny  on 05/02/23    Caio Pérez M.D.  [address] [address] [address]  Via Fax: [phone]

## 2023-05-02 NOTE — PROGRESS NOTES
Chief Complaint   Patient presents with    Follow-Up     Apnea //  last seen 1/24/2023       HPI:  Mert Carl II is a 42 y.o. year old male here today for follow-up on JUAN MANUEL; compliance check.  Last OV 1/24/23     Mask fit performed since last OV.    Currently using CPAP @ 11cm H20 nightly; RESPIRONICS; device obtained 2021.  Compliance report 4/2/2023 through 5/1/2023 indicates 100% compliance, average nightly is 4 hours 53 minutes, minimal mask leak of 6 minutes per night, with a reduced AHI of 4.2/h.  Reviewed details with patient. He tolerates mask and pressure well, no AM headaches or fogginess. He notes he can fall asleep in the evenings if in front of the TV. He goes to bed between 10pm-MN. Reviewed going to bed earlier if sleepy. He likes the new mask but due to deviated septum continue to have dry mouth at night. He generally wakes by 4:30am and sometimes able to go back to bed briefly if baby goes back to sleep.  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   He denies cardiac or respiratory symptoms. He feels better on therapy.    SLEEP HX:  PSG split night 2/27/21 noted sleep efficiency of 50%, overall AHI 51.4/h and REM AHI 52.71/h with supine AHI 57.01/h.  39% events were central.  PLMS index of 23.1/h.  Mean SPO2 97% with O2 mikal of 89%.  Patient was titrated on CPAP and BiPAP with best tolerated pressure BiPAP 18/13 cm.  AHI improved to 3.53/h and O2 mikal of 95%.  Treatment emergent central apneas noted.  Overall incomplete titration study.         ROS: As per HPI and otherwise negative if not stated.    Past Medical History:   Diagnosis Date    Chickenpox        History reviewed. No pertinent surgical history.    Family History   Problem Relation Age of Onset    Sleep Apnea Neg Hx        Social History     Socioeconomic History    Marital status:      Spouse name: Not on file    Number of children: Not on file    Years of education: Not on file  "   Highest education level: Not on file   Occupational History    Not on file   Tobacco Use    Smoking status: Never    Smokeless tobacco: Never   Vaping Use    Vaping Use: Every day    Substances: Nicotine   Substance and Sexual Activity    Alcohol use: Yes     Comment: 2-3 a day     Drug use: Not Currently    Sexual activity: Not on file   Other Topics Concern    Not on file   Social History Narrative    Not on file     Social Determinants of Health     Financial Resource Strain: Not on file   Food Insecurity: Not on file   Transportation Needs: Not on file   Physical Activity: Not on file   Stress: Not on file   Social Connections: Not on file   Intimate Partner Violence: Not on file   Housing Stability: Not on file       Allergies as of 05/02/2023    (No Known Allergies)        Vitals:  /70 (BP Location: Left arm, Patient Position: Sitting, BP Cuff Size: Adult)   Pulse 69   Resp 16   Ht 1.753 m (5' 9\")   Wt 78.5 kg (173 lb)   SpO2 93%     Current medications as of today   Current Outpatient Medications   Medication Sig Dispense Refill    amLODIPine (NORVASC) 2.5 MG Tab Take 1 Tablet by mouth every day. 90 Tablet 2    cetirizine (ZYRTEC) 10 MG Tab Take 1 tablet (10 mg) by mouth daily for 90 days. 90 Tablet 1    amoxicillin (AMOXIL) 500 MG Cap Take 1 capsule by mouth 4 times a day until gone. 28 Capsule 0    escitalopram (LEXAPRO) 10 MG Tab Take 1 tablet (10 mg) by mouth once daily for 90 days. 90 Tablet 2    omeprazole (PRILOSEC) 40 MG delayed-release capsule Take 1 capsule (40 mg) by mouth every morning before breakfast. 90 Capsule 3    cabergoline (DOSTINEX) 0.5 MG tablet Take 1 Tablet by mouth every 72 hours. 24 Tablet 3    MULTIPLE VITAMINS-MINERALS ER PO Take 50 mg by mouth every day.      bromocriptine (PARLODEL) 2.5 MG Tab Take 1 Tablet by mouth every day.      escitalopram (LEXAPRO) 10 MG Tab Take 1 Tablet by mouth every day.      Zinc Sulfate (ZINC 15 PO) zinc      ZINC GLUCONATE PO Take  by " mouth.       No current facility-administered medications for this visit.         Physical Exam:   Gen:           Alert and oriented, No apparent distress. Mood and affect appropriate, normal interaction with examiner.  Eyes:          PERRL, EOM intact, sclere white, conjunctive moist.  Ears:          Not examined.   Hearing:     Grossly intact.  Nose:          Normal, no lesions or deformities.  Dentition:    Not examined.   Oropharynx:   Not examined.   Mallampati Classification: Not examined.   Neck:        Supple, trachea midline, no masses.  Respiratory Effort: No intercostal retractions or use of accessory muscles.   Lung Auscultation:      Clear to auscultation bilaterally; no rales, rhonchi or wheezing.  CV:            Regular rate and rhythm. No murmurs, rubs or gallops.  Abd:           Not examined.   Lymphadenopathy: Not examined.  Gait and Station: Normal.  Digits and Nails: No clubbing, cyanosis, petechiae, or nodes.   Cranial Nerves: II-XII grossly intact.  Skin:        No rashes, lesions or ulcers noted.               Ext:           No cyanosis or edema.      Assessment:  1. Obstructive sleep apnea        2. BMI 25.0-25.9,adult        3. Nonsmoker            Immunizations:    Flu:11/3/22  Pneumovax 23:not due  Prevnar 13:not due  PCV 20: not due  COVID-19: 5/13/21    Plan:  JUAN MANUEL is well controlled. Continue CPAP 11cm with new mask. Reviewed sleep hygiene and the need to go to bed earlier than fall asleep on cough. Remedies for dry mouth discussed including mouth tape and Xylimelts for trial. Due to deviated septum, he may want to have ENT evaluate for possible surgery in the future to correct since this would improve his airway and therefore sleep apnea.  Follow up with PCP for other health concerns  Follow up in 6 months for compliance check, sooner if needed.    Please note that this dictation was created using voice recognition software. I have made every reasonable attempt to correct obvious  errors, but it is possible there are errors of grammar and possibly content that I did not discover before finalizing the note.

## 2023-05-10 ENCOUNTER — HOSPITAL ENCOUNTER (OUTPATIENT)
Dept: LAB | Facility: MEDICAL CENTER | Age: 43
End: 2023-05-10
Payer: COMMERCIAL

## 2023-05-10 DIAGNOSIS — E22.1 HYPERPROLACTINEMIA (HCC): ICD-10-CM

## 2023-05-10 LAB
ALBUMIN SERPL BCP-MCNC: 4 G/DL (ref 3.2–4.9)
ALBUMIN/GLOB SERPL: 1.3 G/DL
ALP SERPL-CCNC: 56 U/L (ref 30–99)
ALT SERPL-CCNC: 17 U/L (ref 2–50)
ANION GAP SERPL CALC-SCNC: 10 MMOL/L (ref 7–16)
AST SERPL-CCNC: 22 U/L (ref 12–45)
BILIRUB SERPL-MCNC: 0.6 MG/DL (ref 0.1–1.5)
BUN SERPL-MCNC: 17 MG/DL (ref 8–22)
CALCIUM ALBUM COR SERPL-MCNC: 8.8 MG/DL (ref 8.5–10.5)
CALCIUM SERPL-MCNC: 8.8 MG/DL (ref 8.5–10.5)
CHLORIDE SERPL-SCNC: 108 MMOL/L (ref 96–112)
CO2 SERPL-SCNC: 23 MMOL/L (ref 20–33)
CREAT SERPL-MCNC: 0.83 MG/DL (ref 0.5–1.4)
GFR SERPLBLD CREATININE-BSD FMLA CKD-EPI: 112 ML/MIN/1.73 M 2
GLOBULIN SER CALC-MCNC: 3 G/DL (ref 1.9–3.5)
GLUCOSE SERPL-MCNC: 91 MG/DL (ref 65–99)
POTASSIUM SERPL-SCNC: 4.5 MMOL/L (ref 3.6–5.5)
PROLACTIN SERPL-MCNC: 1.81 NG/ML (ref 2.1–17.7)
PROT SERPL-MCNC: 7 G/DL (ref 6–8.2)
SODIUM SERPL-SCNC: 141 MMOL/L (ref 135–145)
T4 FREE SERPL-MCNC: 0.9 NG/DL (ref 0.93–1.7)
TSH SERPL DL<=0.005 MIU/L-ACNC: 1.58 UIU/ML (ref 0.38–5.33)

## 2023-05-10 PROCEDURE — 84305 ASSAY OF SOMATOMEDIN: CPT

## 2023-05-10 PROCEDURE — 84146 ASSAY OF PROLACTIN: CPT

## 2023-05-10 PROCEDURE — 84439 ASSAY OF FREE THYROXINE: CPT

## 2023-05-10 PROCEDURE — 36415 COLL VENOUS BLD VENIPUNCTURE: CPT

## 2023-05-10 PROCEDURE — 80053 COMPREHEN METABOLIC PANEL: CPT

## 2023-05-10 PROCEDURE — 84443 ASSAY THYROID STIM HORMONE: CPT

## 2023-05-12 ENCOUNTER — OFFICE VISIT (OUTPATIENT)
Dept: ENDOCRINOLOGY | Facility: MEDICAL CENTER | Age: 43
End: 2023-05-12
Payer: COMMERCIAL

## 2023-05-12 VITALS
WEIGHT: 173.5 LBS | HEART RATE: 66 BPM | BODY MASS INDEX: 24.84 KG/M2 | HEIGHT: 70 IN | SYSTOLIC BLOOD PRESSURE: 140 MMHG | DIASTOLIC BLOOD PRESSURE: 72 MMHG | OXYGEN SATURATION: 96 %

## 2023-05-12 DIAGNOSIS — E55.9 VITAMIN D DEFICIENCY: ICD-10-CM

## 2023-05-12 DIAGNOSIS — E22.1 HYPERPROLACTINEMIA (HCC): ICD-10-CM

## 2023-05-12 LAB
IGF-I SERPL-MCNC: 194 NG/ML (ref 80–235)
IGF-I Z-SCORE SERPL: 1.1

## 2023-05-12 PROCEDURE — 99214 OFFICE O/P EST MOD 30 MIN: CPT

## 2023-05-12 PROCEDURE — 3077F SYST BP >= 140 MM HG: CPT

## 2023-05-12 PROCEDURE — 99211 OFF/OP EST MAY X REQ PHY/QHP: CPT

## 2023-05-12 PROCEDURE — 3078F DIAST BP <80 MM HG: CPT

## 2023-05-12 RX ORDER — CABERGOLINE 0.5 MG/1
0.5 TABLET ORAL
Qty: 12 TABLET | Refills: 2 | Status: SHIPPED | OUTPATIENT
Start: 2023-05-12 | End: 2024-03-08 | Stop reason: SDUPTHER

## 2023-05-12 ASSESSMENT — FIBROSIS 4 INDEX: FIB4 SCORE: 1.067156750159865083

## 2023-05-12 NOTE — PROGRESS NOTES
Chief Complaint:Follow-up on the following conditions    Subjective:    Mert Carl II is a 41 y.o. male     Hyperprolactinemia:  He was first diagnosed with a pituitary tumor in 11/05/2021.    Presenting symptoms which led to the investigation of the pituitary included hypogonadism as patient is trying to conceive with his spouse.    Initial MRI of the pituitary on 11/05/2021 revealed There is an approximately 3 to 4 mm sized questionable hypoenhancing area in the posterior portion of the pituitary gland. In view of hyperprolactinemia, this is suspicious for pituitary microadenoma..      Pituitary function testing revealed: Prolactin level elevated at 22.6, total testosterone 300.      He was prescribed Bromocriptine 2.5 mg daily by his fertility clinic initially    Currently using cabergoline 0.5 mg twice a week-since April of 2022  Patient has tolerated this therapy well and again is not symptomatic in any form.    He denies headache, denies vision difficulties, denies frequent urination, denies increased thirst, denies galactorrhea.  Last visual field checks was last year-patient is aware that this has to be done every year     Latest Reference Range & Units 05/10/23 07:09   Prolactin 2.10 - 17.70 ng/mL 1.81 (L)        Latest Reference Range & Units 05/10/23 07:09   GFR (CKD-EPI) >60 mL/min/1.73 m 2 112      Latest Reference Range & Units 05/10/23 07:09   IGF1 80 - 235 ng/mL 194     He uses a C pap, he works nights-reports fatigue     Denies constipation, dry skin, losing hair, dry skin   Reports fatigue      Latest Reference Range & Units 05/10/23 07:09   TSH 0.380 - 5.330 uIU/mL 1.580   Free T-4 0.93 - 1.70 ng/dL 0.90 (L)     2.  Vitamin D deficiency:  Currently taking 4000iUS daily of vitamin D3   Latest Reference Range & Units 02/10/23 08:21   25-Hydroxy   Vitamin D 25 30 - 100 ng/mL 57      Latest Reference Range & Units 5/16/22 13:54   25-Hydroxy   Vitamin D 25 30 - 80 ng/mL 19 (L)        Patient's medications, allergies, and social histories were reviewed and updated as appropriate.    ROS:     CONS:     No fever, no chills, no weight loss, no fatigue   EYES:      No diplopia, no blurry vision, no redness of eyes, no swelling of eyelids   ENT:    No hearing loss, No ear pain, No sore throat, no dysphagia, no neck swelling   CV:     No chest pain, no palpitations, no claudication, no orthopnea, no PND   PULM:    No SOB, no cough, no hemoptysis, no wheezing    GI:   No nausea, no vomiting, no diarrhea, no constipation, no bloody stools   :  Passing urine well, no dysuria, no hematuria   ENDO:   No polyuria, no polydipsia, no heat intolerance, no cold intolerance   NEURO: No headaches, no dizziness, no convulsions, no tremors   MUSC:  No joint swellings, no arthralgias, no myalgias, no weakness   SKIN:   No rash, no ulcers, no dry skin   PSYCH:   No depression, no anxiety, no difficulty sleeping       Past Medical History:  Patient Active Problem List    Diagnosis Date Noted    Obstructive sleep apnea 01/24/2023    Depression 04/22/2022    Pituitary adenoma ([facility]) 11/10/2021    Anxiety 09/16/2021    Hyperprolactinemia ([facility]) 09/01/2021    Hypogonadism 09/01/2021    Absence of testicle in scrotum 08/16/2021    Male infertility 08/16/2021       Past Surgical History:  No past surgical history on file.     Allergies:  Patient has no known allergies.     Current Medications:    Current Outpatient Medications:     amLODIPine (NORVASC) 2.5 MG Tab, Take 1 Tablet by mouth every day., Disp: 90 Tablet, Rfl: 2    cetirizine (ZYRTEC) 10 MG Tab, Take 1 tablet (10 mg) by mouth daily for 90 days., Disp: 90 Tablet, Rfl: 1    amoxicillin (AMOXIL) 500 MG Cap, Take 1 capsule by mouth 4 times a day until gone., Disp: 28 Capsule, Rfl: 0    escitalopram (LEXAPRO) 10 MG Tab, Take 1 tablet (10 mg) by mouth once daily for 90 days., Disp: 90 Tablet, Rfl: 2    omeprazole (PRILOSEC) 40 MG delayed-release capsule, Take 1  capsule (40 mg) by mouth every morning before breakfast., Disp: 90 Capsule, Rfl: 3    cabergoline (DOSTINEX) 0.5 MG tablet, Take 1 Tablet by mouth every 72 hours., Disp: 24 Tablet, Rfl: 3    MULTIPLE VITAMINS-MINERALS ER PO, Take 50 mg by mouth every day., Disp: , Rfl:     bromocriptine (PARLODEL) 2.5 MG Tab, Take 1 Tablet by mouth every day., Disp: , Rfl:     escitalopram (LEXAPRO) 10 MG Tab, Take 1 Tablet by mouth every day., Disp: , Rfl:     Zinc Sulfate (ZINC 15 PO), zinc, Disp: , Rfl:     ZINC GLUCONATE PO, Take  by mouth., Disp: , Rfl:     Social History:  Social History     Socioeconomic History    Marital status:      Spouse name: Not on file    Number of children: Not on file    Years of education: Not on file    Highest education level: Not on file   Occupational History    Not on file   Tobacco Use    Smoking status: Never    Smokeless tobacco: Never   Vaping Use    Vaping Use: Every day    Substances: Nicotine   Substance and Sexual Activity    Alcohol use: Yes     Comment: 2-3 a day     Drug use: Not Currently    Sexual activity: Not on file   Other Topics Concern    Not on file   Social History Narrative    Not on file     Social Determinants of Health     Financial Resource Strain: Not on file   Food Insecurity: Not on file   Transportation Needs: Not on file   Physical Activity: Not on file   Stress: Not on file   Social Connections: Not on file   Intimate Partner Violence: Not on file   Housing Stability: Not on file        Family History:   Family History   Problem Relation Age of Onset    Sleep Apnea Neg Hx          PHYSICAL EXAM:   Vital signs:  Vitals:    05/12/23 1306   BP: (!) 140/72   Pulse: 66   SpO2: 96%    GENERAL: Well-developed, well-nourished  in no apparent distress.       ASSESSMENT/PLAN:   1. Hyperprolactinemia (HCC)  Stable  Cabergoline decreased to 0.5 mg weekly with the goal to taper off completely  - cabergoline (DOSTINEX) 0.5 MG tablet; Take 1 Tablet by mouth every 7  days.  Dispense: 12 Tablet; Refill: 2  - PROLACTIN; Future  - TSH; Future  - FREE THYROXINE; Future  - Comp Metabolic Panel; Future  - IGF-1 SOMATOMEDIN; Future    2. Vitamin D deficiency  Stable  Continue regimen-HPI    Disposition: Make an appointment to follow-up in 4 months  Do your blood work 2 weeks prior to next appointment    Thank you kindly for allowing me to participate in the endocrine care plan for this patient.    Juan Alberto Hoffman A.P.R.N.  5/12/2023    CC:   Caio Pérez M.D.

## 2023-07-24 PROCEDURE — RXMED WILLOW AMBULATORY MEDICATION CHARGE: Performed by: FAMILY MEDICINE

## 2023-07-27 ENCOUNTER — PHARMACY VISIT (OUTPATIENT)
Dept: PHARMACY | Facility: MEDICAL CENTER | Age: 43
End: 2023-07-27
Payer: COMMERCIAL

## 2023-08-30 PROCEDURE — RXMED WILLOW AMBULATORY MEDICATION CHARGE: Performed by: FAMILY MEDICINE

## 2023-09-05 ENCOUNTER — PHARMACY VISIT (OUTPATIENT)
Dept: PHARMACY | Facility: MEDICAL CENTER | Age: 43
End: 2023-09-05
Payer: COMMERCIAL

## 2023-09-08 ENCOUNTER — PATIENT MESSAGE (OUTPATIENT)
Dept: ENDOCRINOLOGY | Facility: MEDICAL CENTER | Age: 43
End: 2023-09-08
Payer: COMMERCIAL

## 2023-09-15 ENCOUNTER — APPOINTMENT (OUTPATIENT)
Dept: ENDOCRINOLOGY | Facility: MEDICAL CENTER | Age: 43
End: 2023-09-15
Payer: COMMERCIAL

## 2023-11-06 ENCOUNTER — APPOINTMENT (OUTPATIENT)
Dept: SLEEP MEDICINE | Facility: MEDICAL CENTER | Age: 43
End: 2023-11-06
Attending: NURSE PRACTITIONER
Payer: COMMERCIAL

## 2023-11-22 PROCEDURE — RXMED WILLOW AMBULATORY MEDICATION CHARGE

## 2023-11-22 PROCEDURE — RXMED WILLOW AMBULATORY MEDICATION CHARGE: Performed by: FAMILY MEDICINE

## 2023-11-25 ENCOUNTER — PHARMACY VISIT (OUTPATIENT)
Dept: PHARMACY | Facility: MEDICAL CENTER | Age: 43
End: 2023-11-25
Payer: COMMERCIAL

## 2023-12-07 ENCOUNTER — TELEPHONE (OUTPATIENT)
Dept: SLEEP MEDICINE | Facility: MEDICAL CENTER | Age: 43
End: 2023-12-07
Payer: COMMERCIAL

## 2023-12-07 NOTE — TELEPHONE ENCOUNTER
Pt's spouse brought in pt's SD card to have compliance reviewed to make sure things are still looking / working ok for pt.  Pt and spouse are aware that pt is overdue but he recently has had some issues with his insurance which is why he had to cancel his 11/6/2023 appt and rescheduled for 2/8/2024. Compliance scanned for review. Please advise.

## 2023-12-07 NOTE — TELEPHONE ENCOUNTER
Compliance report 11/7/2023 through 12/6/2023 notes 96.7% compliance, average nightly use 6 hours 4 minutes, longest use 9 hours 12 minutes, minimal mask leak with a reduced AHI of 2.8/h.  This looks wonderful. [employer] message sent to patient.

## 2024-02-08 ENCOUNTER — OFFICE VISIT (OUTPATIENT)
Dept: SLEEP MEDICINE | Facility: MEDICAL CENTER | Age: 44
End: 2024-02-08
Attending: NURSE PRACTITIONER
Payer: COMMERCIAL

## 2024-02-08 VITALS
RESPIRATION RATE: 16 BRPM | HEART RATE: 58 BPM | HEIGHT: 69 IN | DIASTOLIC BLOOD PRESSURE: 72 MMHG | BODY MASS INDEX: 24.14 KG/M2 | SYSTOLIC BLOOD PRESSURE: 120 MMHG | WEIGHT: 163 LBS | OXYGEN SATURATION: 97 %

## 2024-02-08 DIAGNOSIS — R40.0 DAYTIME SOMNOLENCE: ICD-10-CM

## 2024-02-08 DIAGNOSIS — S09.92XS BLUNT TRAUMA OF NOSE, SEQUELA: ICD-10-CM

## 2024-02-08 DIAGNOSIS — G47.33 OBSTRUCTIVE SLEEP APNEA: Chronic | ICD-10-CM

## 2024-02-08 DIAGNOSIS — J34.89 OBSTRUCTION OF PARANASAL SINUS: ICD-10-CM

## 2024-02-08 DIAGNOSIS — Z78.9 NONSMOKER: ICD-10-CM

## 2024-02-08 PROBLEM — S09.92XA BLUNT TRAUMA OF NOSE: Status: ACTIVE | Noted: 2024-02-08

## 2024-02-08 PROCEDURE — 99213 OFFICE O/P EST LOW 20 MIN: CPT | Performed by: NURSE PRACTITIONER

## 2024-02-08 PROCEDURE — 99215 OFFICE O/P EST HI 40 MIN: CPT | Performed by: NURSE PRACTITIONER

## 2024-02-08 PROCEDURE — 3074F SYST BP LT 130 MM HG: CPT | Performed by: NURSE PRACTITIONER

## 2024-02-08 PROCEDURE — 3078F DIAST BP <80 MM HG: CPT | Performed by: NURSE PRACTITIONER

## 2024-02-08 ASSESSMENT — PATIENT HEALTH QUESTIONNAIRE - PHQ9: CLINICAL INTERPRETATION OF PHQ2 SCORE: 0

## 2024-02-08 ASSESSMENT — FIBROSIS 4 INDEX: FIB4 SCORE: 1.09

## 2024-02-08 NOTE — PATIENT INSTRUCTIONS
Follow up with ENT about nose    Consider sleep study in April for rule out of narcolepsy    Continue chin strap, try heated tube for dryness

## 2024-02-08 NOTE — PROGRESS NOTES
Chief Complaint   Patient presents with    Follow-Up      Apnea // last seen 5/2/2023       HPI:  Mert Carl II is a 43 y.o. year old male here today for follow-up on JUAN MANUEL.  Last OV 5/2/23     Currently using CPAP @ 11cm H20 nightly; RESPIRONICS; device obtained 2021.   Compliance report 1/9/2024 through 2/7/2024 indicates 100% compliance, average nightly is 5 hours 45 minutes, minimal mask leak with reduced AHI of 2.2/h.  Reviewed with patient.    Interval events:  Patient's main complaint is ongoing dry mouth but he obtained a chinstrap that he started using last night which seemed to be improved.  He has tried XyliMelts with minimal benefit.  He continues to report daytime fatigue even though he is keeping a more consistent schedule and getting more sleep hours.  His wife notes working Monday through Friday generally obtaining sleep but will fall asleep on the couch and she will have to prompt him between 10-12 midnight to go to bed.  He then wakes 3-4 times at night usually from dry mouth.  On Friday and Saturday nights he will go out socializing only obtain 2 to 4 hours of rest.  He has no family history since he was adopted.  He is unsure if there is a history of narcolepsy in the family.  He denies hypnopompic or hypnagogic symptoms.  He also has a history of nasal trauma and cannot breathe through the right side of his nose.  His nose is deviated on appearance.    SLEEP HX:  PSG split night 2/27/21 noted sleep efficiency of 50%, overall AHI 51.4/h and REM AHI 52.71/h with supine AHI 57.01/h.  39% events were central.  PLMS index of 23.1/h.  Mean SPO2 97% with O2 mikal of 89%.  Patient was titrated on CPAP and BiPAP with best tolerated pressure BiPAP 18/13 cm.  AHI improved to 3.53/h and O2 mikal of 95%.  Treatment emergent central apneas noted.  Overall incomplete titration study.      ROS: As per HPI and otherwise negative if not stated.    Past Medical History:   Diagnosis Date    Chickenpox   "      History reviewed. No pertinent surgical history.    Family History   Problem Relation Age of Onset    Sleep Apnea Neg Hx        Social History     Socioeconomic History    Marital status:      Spouse name: Not on file    Number of children: Not on file    Years of education: Not on file    Highest education level: Not on file   Occupational History    Not on file   Tobacco Use    Smoking status: Never    Smokeless tobacco: Never   Vaping Use    Vaping Use: Every day    Substances: Nicotine   Substance and Sexual Activity    Alcohol use: Yes     Comment: 2-3 a day     Drug use: Not Currently    Sexual activity: Not on file   Other Topics Concern    Not on file   Social History Narrative    Not on file     Social Determinants of Health     Financial Resource Strain: Not on file   Food Insecurity: Not on file   Transportation Needs: Not on file   Physical Activity: Not on file   Stress: Not on file   Social Connections: Not on file   Intimate Partner Violence: Not on file   Housing Stability: Not on file       Allergies as of 02/08/2024    (No Known Allergies)        Vitals:  /72 (BP Location: Left arm, Patient Position: Sitting, BP Cuff Size: Adult)   Pulse (!) 58   Resp 16   Ht 1.753 m (5' 9\")   Wt 73.9 kg (163 lb)   SpO2 97%     Current medications as of today   Current Outpatient Medications   Medication Sig Dispense Refill    cetirizine (ZYRTEC) 10 MG Tab Take 1 tablet (10 mg) by mouth daily for 90 days. 90 Tablet 1    omeprazole (PRILOSEC) 40 MG delayed-release capsule Take 1 capsule (40 mg) by mouth every morning before breakfast. 90 Capsule 3    cabergoline (DOSTINEX) 0.5 MG tablet Take 1 Tablet by mouth every 7 days. 12 Tablet 2    amLODIPine (NORVASC) 2.5 MG Tab Take 1 Tablet by mouth every day. 90 Tablet 2    escitalopram (LEXAPRO) 10 MG Tab Take 1 tablet (10 mg) by mouth once daily for 90 days. 90 Tablet 2    MULTIPLE VITAMINS-MINERALS ER PO Take 50 mg by mouth every day.      " bromocriptine (PARLODEL) 2.5 MG Tab Take 1 Tablet by mouth every day.      escitalopram (LEXAPRO) 10 MG Tab Take 1 Tablet by mouth every day.      Zinc Sulfate (ZINC 15 PO) zinc      ZINC GLUCONATE PO Take  by mouth.       No current facility-administered medications for this visit.         Physical Exam:   Gen:           Alert and oriented, No apparent distress. Mood and affect appropriate, normal interaction with examiner.  Eyes:          PERRL, EOM intact, sclere white, conjunctive moist.  Ears:          Not examined.   Hearing:     Grossly intact.  Nose:          Normal, no lesions or deformities.  Dentition:    Not examined.   Oropharynx:   Not examined.   Mallampati Classification: Not examined.   Neck:        Supple, trachea midline, no masses.  Respiratory Effort: No intercostal retractions or use of accessory muscles.   Lung Auscultation:      Clear to auscultation bilaterally; no rales, rhonchi or wheezing.  CV:            Regular rate and rhythm. No murmurs, rubs or gallops.  Abd:           Not examined.   Lymphadenopathy: Not examined.  Gait and Station: Normal.  Digits and Nails: No clubbing, cyanosis, petechiae, or nodes.   Cranial Nerves: II-XII grossly intact.  Skin:        No rashes, lesions or ulcers noted.               Ext:           No cyanosis or edema.      Assessment:  1. Obstructive sleep apnea  Referral to ENT    Polysomnography Titration    Multiple Sleep Latency Test    DME Mask and Supplies      2. Obstruction of paranasal sinus  Referral to ENT      3. Blunt trauma of nose, sequela  Referral to ENT      4. Daytime somnolence  Polysomnography Titration    Multiple Sleep Latency Test      5. BMI 24.0-24.9, adult        6. Nonsmoker            Immunizations:    Flu:9/5/23  Pneumovax 23:not due  Prevnar 13:not due  PCV 20: not due  COVID-19: 2021    Plan:  JUAN MANUEL is overall improved on therapy and he is sleeping longer.  He does find benefit from therapy and that overall his fatigue has  improved.  He continues to have daytime fatigue and can easily take a nap.  He denies symptoms of narcolepsy but recommend titration study on CPAP 11 cm followed by MSLT to rule out narcolepsy.  He would like to avoid stimulant use.   DME mask/supplies   Ttiration study on CPAP followed by MSLT  Referral to ENT for evaluation obstruction to his sinuses also interfering with his day-to-day breathing and sleep apnea.   Referral ENT  Daytime somnolence could be multifactorial due to inconsistent sleep hours, history of anemia and possible underlying somnolence issues.  Recommend follow-up with primary care for workup of chronic fatigue and review of anemia.  Follow-up in 3 months after sleep study with compliance report, sooner if needed.  Patient may message me via [employer]can with any other questions or concerns.    Please note that this dictation was created using voice recognition software. I have made every reasonable attempt to correct obvious errors, but it is possible there are errors of grammar and possibly content that I did not discover before finalizing the note.

## 2024-02-21 ENCOUNTER — OCCUPATIONAL MEDICINE (OUTPATIENT)
Dept: URGENT CARE | Facility: CLINIC | Age: 44
End: 2024-02-21
Payer: COMMERCIAL

## 2024-02-21 VITALS
TEMPERATURE: 98.1 F | BODY MASS INDEX: 24.44 KG/M2 | SYSTOLIC BLOOD PRESSURE: 158 MMHG | WEIGHT: 165 LBS | HEIGHT: 69 IN | OXYGEN SATURATION: 95 % | HEART RATE: 65 BPM | RESPIRATION RATE: 16 BRPM | DIASTOLIC BLOOD PRESSURE: 98 MMHG

## 2024-02-21 DIAGNOSIS — S41.111A LACERATION OF RIGHT UPPER EXTREMITY, INITIAL ENCOUNTER: ICD-10-CM

## 2024-02-21 PROCEDURE — 3080F DIAST BP >= 90 MM HG: CPT | Performed by: PHYSICIAN ASSISTANT

## 2024-02-21 PROCEDURE — 3077F SYST BP >= 140 MM HG: CPT | Performed by: PHYSICIAN ASSISTANT

## 2024-02-21 PROCEDURE — 12032 INTMD RPR S/A/T/EXT 2.6-7.5: CPT | Mod: RT | Performed by: PHYSICIAN ASSISTANT

## 2024-02-21 ASSESSMENT — FIBROSIS 4 INDEX: FIB4 SCORE: 1.09

## 2024-02-21 NOTE — LETTER
[facility] [address] [place] [address]  Phone:  [phone] - Fax:  [phone]   [facility] Progress Report and Disability Certification  Date of Service: 2024   No Show:  No  Date / Time of Next Visit: 2024 @ 12 PM    Claim Information   Patient Name: Mert Carl II  Claim Number:     Employer: [employer]  Date of Injury: 2024     Insurer / TPA: Georgette Juárez  ID / SSN:     Occupation: [employer] Employee  Diagnosis: The encounter diagnosis was Laceration of right upper extremity, initial encounter.    Medical Information   Related to Industrial Injury? Yes    Subjective Complaints:  aceration   The incident occurred 3 to 6 hours ago. The laceration is located on the Right arm. The laceration is 3 cm in size. The laceration mechanism was a metal edge. The pain is mild. The pain has been Constant since onset. He reports no foreign bodies present. His tetanus status is unknown.      Objective Findings: Musculoskeletal:      Comments: 3 cm gaping laceration on the right proximal forearm that extends into the adipose tissue.  No visible muscles or tendons.  Elbow range of motion within normal limits.  Distal sensation intact.  Radial pulse 2+.    Pre-Existing Condition(s):     Assessment:   Initial Visit    Status: Additional Care Required  Permanent Disability:No    Plan:      Diagnostics:      Comments:  Tdap UTD.  Suture repair performed.  Wound care instructions reviewed at length.  We also reviewed signs and symptoms of infection and patient was given strict return precautions.  Follow-up in 48 hours for recheck.  Recommend suture removal in 12 to 14 days.      Disability Information   Status: Released to Restricted Duty    From:  2024  Through: 2024 Restrictions are: Temporary   Physical Restrictions   Sitting:    Standing:    Stooping:    Bending:      Squatting:    Walking:    Climbing:    Pushin hrs/day    Pullin hrs/day Other:    Reaching Above Shoulder (L):   Reaching Above Shoulder (R): 0 hrs/day     Reaching Below Shoulder (L):    Reaching Below Shoulder (R):  0 hrs/day   Not to exceed Weight Limits   Carrying(hrs):   Weight Limit(lb): < or = to 10 pounds Lifting(hrs):   Weight  Limit(lb): < or = to 10 pounds   Comments:      Repetitive Actions   Hands: i.e. Fine Manipulations from Grasping:     Feet: i.e. Operating Foot Controls:     Driving / Operate Machinery:     Health Care Provider’s Original or Electronic Signature  Cesar Finnegan P.A.-C. Health Care Provider’s Original or Electronic Signature    Maxwell Vera DO MPH     Clinic Name / Location: [place] [address]  [place] [address] Clinic Phone Number: Dept: [phone]   Appointment Time: 3:45 Pm Visit Start Time: 5:26 PM   Check-In Time:  3:46 Pm Visit Discharge Time: 6:16 PM    Original-Treating Physician or Chiropractor    Page 2-Insurer/TPA    Page 3-Employer    Page 4-Employee

## 2024-02-21 NOTE — LETTER
"    EMPLOYEE’S CLAIM FOR COMPENSATION/ REPORT OF INITIAL TREATMENT  FORM C-4  PLEASE TYPE OR PRINT    EMPLOYEE’S CLAIM - PROVIDE ALL INFORMATION REQUESTED   First Name                    ADELE Painting Last Name  Siddhartha Birthdate                    1980                Sex  []M  []F Claim Number (Insurer’s Use Only)     Home Address  [address] Age  43 y.o. 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 (if applicable)  Loading Will Call    How did this injury or occupational disease occur? (Be specific and answer in detail. Use additional sheet if necessary)  Loading a piece of stainless steel and side of steel cut through shirt and arm   If you believe that you have an occupational disease, when did you first have knowledge of the disability and its relationship to your employment?  n/a Witnesses to the Accident (if applicable)  n/a      Nature of Injury or Occupational Disease  Workers' Compensation  Part(s) of Body Injured or Affected  Upper Arm (R) Lower Arm (R) N/A    I CERTIFY THAT THE ABOVE IS TRUE AND CORRECT TO T HE " BEST OF MY KNOWLEDGE AND THAT I HAVE PROVIDED THIS INFORMATION IN ORDER TO OBTAIN THE BENEFITS OF [place], OR [place]).  I HEREBY AUTHORIZE ANY PHYSICIAN, CHIROPRACTOR, SURGEON, PRACTITIONER OR ANY OTHER PERSON, ANY HOSPITAL, INCLUDING [facility] OR [facility], ANY  MEDICAL SERVICE ORGANIZATION, ANY INSURANCE COMPANY, OR OTHER INSTITUTION OR ORGANIZATION TO RELEASE TO EACH OTHER, ANY MEDICAL OR OTHER INFORMATION, INCLUDING BENEFITS PAID OR PAYABLE, PERTINENT TO THIS INJURY OR DISEASE, EXCEPT INFORMATION RELATIVE TO DIAGNOSIS, TREATMENT AND/OR COUNSELING FOR AIDS, PSYCHOLOGICAL CONDITIONS, ALCOHOL OR CONTROLLED SUBSTANCES, FOR WHICH I MUST GIVE SPECIFIC AUTHORIZATION.  A PHOTOSTAT OF THIS AUTHORIZATION SHALL BE VALID AS THE ORIGINAL.     Date 02/21/24    [facility]  Employee’s Original or  *Electronic Signature   THIS REPORT MUST BE COMPLETED AND MAILED WITHIN 3 WORKING DAYS OF TREATMENT   Place  [facility]    Name of Facility  [facility]   Date 2/21/2024 Diagnosis and Description of Injury or Occupational Disease  (S41.111A) Laceration of right upper extremity, initial encounter  The encounter diagnosis was Laceration of right upper extremity, initial encounter. Is there evidence that the injured employee was under the influence of alcohol and/or another controlled substance at the time of accident?  []No  [] Yes (if yes, please explain)   Hour 5:26 PM  No   Treatment: Tdap UTD.  Suture repair performed.  Wound care instructions reviewed at length.  We also reviewed signs and symptoms of infection and patient was given strict return precautions.  Follow-up in 48 hours for recheck.  Recommend suture removal in 12 to 14 days.      Have you advised the patient to remain off work five days or more?   [] Yes Indicate dates: From   To    []No If no, is the injured employee capable of: [] full  duty [] modified duty                                                             No  Yes  If modified duty, specify any limitations / restrictions:  10 pound weight restriction.  No push, pull, reaching.                                                                                                                                                                                                                                                                                                                                                                                                               X-Ray Findings:      From information given by the employee, together with medical evidence, can you directly connect this injury or occupational disease as job incurred?  []Yes   [] No Yes    Is additional medical care by a physician indicated? []Yes [] No  No    Do you know of any previous injury or disease contributing to this condition or occupational disease? []Yes [] No (Explain if yes)                          No   Date  2/21/2024 Print Health Care Provider’s Name  Steph Finnegan P.A.-C. I certify that the employer’s copy of  this form was delivered to the employer on:   Address  [address] [place] [address] [address] INSURER'S USE ONLY                       [facility]  [address] Provider’s Tax ID Number  [id number]   Telephone  Dept: [phone]    Health Care Provider’s Original or Electronic Signature  e-STEPH Oconnell P.A.-C. Degree (MD,DO, DC,PABriseidaC,APRN)  PACANDY  Choose (if applicable)      ORIGINAL - TREATING HEALTHCARE PROVIDER PAGE 2 - INSURER/TPA PAGE 3 - EMPLOYER PAGE 4 - EMPLOYEE             Form C-4 (rev.08/23)        BRIEF DESCRIPTION OF RIGHTS AND BENEFITS  (Pursuant to [place] 616C.050)    Notice of Injury or Occupational Disease (Incident Report Form C-1): If an injury or occupational disease (OD) arises out of and in the course of employment, you must provide written notice to your employer as soon as practicable,  "but no later than 7 days after the accident or OD. Your employer shall maintain a sufficient supply of the required forms.    Claim for Compensation (Form C-4): If medical treatment is sought, the form C-4 is available at the place of initial treatment. A completed \"Claim for Compensation\" (Form C-4) must be filed within 90 days after an accident or OD. The treating physician or chiropractor must, within 3 working days after treatment, complete and mail to the employer, the employer's insurer and third-party , the Claim for Compensation.    Medical Treatment: If you require medical treatment for your on-the-job injury or OD, you may be required to select a physician or chiropractor from a list provided by your workers’ compensation insurer, if it has contracted with an Organization for Managed Care (MCO) or Preferred Provider Organization (PPO) or providers of health care. If your employer has not entered into a contract with an MCO or PPO, you may select a physician or chiropractor from the Panel of Physicians and Chiropractors. Any medical costs related to your industrial injury or OD will be paid by your insurer.    Temporary Total Disability (TTD): If your doctor has certified that you are unable to work for a period of at least 5 consecutive days, or 5 cumulative days in a 20-day period, or places restrictions on you that your employer does not accommodate, you may be entitled to TTD compensation.    Temporary Partial Disability (TPD): If the wage you receive upon reemployment is less than the compensation for TTD to which you are entitled, the insurer may be required to pay you TPD compensation to make up the difference. TPD can only be paid for a maximum of 24 months.    Permanent Partial Disability (PPD): When your medical condition is stable and there is an indication of a PPD as a result of your injury or OD, within 30 days, your insurer must arrange for an evaluation by a rating physician or " chiropractor to determine the degree of your PPD. The amount of your PPD award depends on the date of injury, the results of the PPD evaluation, your age and wage.    Permanent Total Disability (PTD): If you are medically certified by a treating physician or chiropractor as permanently and totally disabled and have been granted a PTD status by your insurer, you are entitled to receive monthly benefits not to exceed 66 2/3% of your average monthly wage. The amount of your PTD payments is subject to reduction if you previously received a lump-sum PPD award.    Vocational Rehabilitation Services: You may be eligible for vocational rehabilitation services if you are unable to return to the job due to a permanent physical impairment or permanent restrictions as a result of your injury or occupational disease.    Transportation and Per Bianka Reimbursement: You may be eligible for travel expenses and per bianka associated with medical treatment.    Reopening: You may be able to reopen your claim if your condition worsens after claim closure.     Appeal Process: If you disagree with a written determination issued by the insurer or the insurer does not respond to your request, you may appeal to the Department of Administration, , by following the instructions contained in your determination letter. You must appeal the determination within 70 days from the date of the determination letter at [address], [address], [place], [place] [address], or [address] [facility] [address], [place] [address]. If you disagree with the  decision, you may appeal to the Department of Administration, . You must file your appeal within 30 days from the date of the  decision letter at [address], [address], [place], [place] [address], or [address] [facility] [address], [place], [place] [address]. If you disagree with a decision of an , you may file a  petition for judicial review with the District Court. You must do so within 30 days of the Appeal Officer’s decision. You may be represented by an  at your own expense or you may contact the [facility] for possible representation.    [place]  for Injured Workers (NAIW): If you disagree with a  decision, you may request that NAIW represent you without charge at an  Hearing. For information regarding denial of benefits, you may contact the [facility] at: [address], [address], [place] [address], [phone], or [address], [address], [place] [address], [phone]    To File a Complaint with the Division: If you wish to file a complaint with the  of the Division of Industrial Relations (DIR),  please contact the Workers’ Compensation Section, [address] [place] [address], [place], [place] [address], telephone [phone], or [address], [address], [place], [place] [address], telephone [phone].    For assistance with Workers’ Compensation Issues: You may contact the [facility] Office for Consumer Health Assistance, [address], [address] [facility] [address], [place], [place] [address], Toll Free 1-743.412.1195, Web site: http://[facility].[place].gov/Programs/KERI E-mail: keri@[email or web address]              __________________________________________________________________                                    _________________            Employee Name / Signature                                                                                                                            Date                                                                                                                                                                                                                              D-2 (rev. 10/20)

## 2024-02-22 NOTE — PROGRESS NOTES
"Subjective:   Mert Carl II is a 43 y.o. male who presents for Laceration (WC NEW R inner elbow)       Laceration   The incident occurred 3 to 6 hours ago. The laceration is located on the Right arm. The laceration is 3 cm in size. The laceration mechanism was a metal edge. The pain is mild. The pain has been Constant since onset. He reports no foreign bodies present. His tetanus status is unknown.     ROS    PMH: Past medical history reviewed in Epic  MEDS: Medications were reviewed in Epic  ALLERGIES: Allergies were reviewed in Epic     Objective:   BP (!) 158/98   Pulse 65   Temp 36.7 °C (98.1 °F) (Temporal)   Resp 16   Ht 1.753 m (5' 9\")   Wt 74.8 kg (165 lb)   SpO2 95%   BMI 24.37 kg/m²   Physical Exam  Vitals reviewed.   Constitutional:       General: He is not in acute distress.     Appearance: Normal appearance. He is well-developed. He is not toxic-appearing.   HENT:      Head: Normocephalic and atraumatic.      Right Ear: External ear normal.      Left Ear: External ear normal.      Nose: Nose normal.   Cardiovascular:      Rate and Rhythm: Normal rate and regular rhythm.   Pulmonary:      Effort: Pulmonary effort is normal. No respiratory distress.      Breath sounds: No stridor.   Musculoskeletal:      Comments: 3 cm gaping laceration on the right proximal forearm that extends into the adipose tissue.  No visible muscles or tendons.  Elbow range of motion within normal limits.  Distal sensation intact.  Radial pulse 2+.   Skin:     General: Skin is dry.   Neurological:      Comments: Alert and oriented.    Psychiatric:         Speech: Speech normal.         Behavior: Behavior normal.        Assessment/Plan:   1. Laceration of right upper extremity, initial encounter      Tdap UTD.  Suture repair performed.  Layered closure performed due to depth of wound.  Wound care instructions reviewed at length.  We also reviewed signs and symptoms of infection and patient was given strict return " precautions.  Follow-up in 48 hours for recheck.  Recommend suture removal in 12 to 14 days.

## 2024-02-22 NOTE — PROCEDURES
Laceration Repair    Date/Time: 2/21/2024 7:11 PM    Performed by: Cesar Finnegan P.A.-C.  Authorized by: Cesar Finnegan P.A.-C.  Body area: upper extremity  Location details: right lower arm  Laceration length: 3 cm  Foreign bodies: no foreign bodies  Tendon involvement: none  Nerve involvement: none  Vascular damage: no  Anesthesia: local infiltration    Anesthesia:  Local Anesthetic: lidocaine 2% with epinephrine  Anesthetic total: 5 mL    Sedation:  Patient sedated: no    Preparation: Patient was prepped and draped in the usual sterile fashion.  Irrigation solution: saline  Irrigation method: jet lavage  Amount of cleaning: extensive  Debridement: none  Degree of undermining: none  Skin closure: 4-0 nylon (5)  Subcutaneous closure: 4-0 Vicryl (3)  Number of sutures: 8  Technique: simple  Approximation: close  Approximation difficulty: simple  Dressing: pressure dressing  Patient tolerance: patient tolerated the procedure well with no immediate complications

## 2024-02-24 ENCOUNTER — OCCUPATIONAL MEDICINE (OUTPATIENT)
Dept: URGENT CARE | Facility: CLINIC | Age: 44
End: 2024-02-24
Payer: COMMERCIAL

## 2024-02-24 VITALS
HEART RATE: 62 BPM | HEIGHT: 69 IN | WEIGHT: 165 LBS | SYSTOLIC BLOOD PRESSURE: 152 MMHG | DIASTOLIC BLOOD PRESSURE: 102 MMHG | RESPIRATION RATE: 16 BRPM | TEMPERATURE: 99.1 F | OXYGEN SATURATION: 98 % | BODY MASS INDEX: 24.44 KG/M2

## 2024-02-24 DIAGNOSIS — S41.111D LACERATION OF RIGHT UPPER EXTREMITY, SUBSEQUENT ENCOUNTER: ICD-10-CM

## 2024-02-24 PROCEDURE — 3080F DIAST BP >= 90 MM HG: CPT | Performed by: FAMILY MEDICINE

## 2024-02-24 PROCEDURE — 99213 OFFICE O/P EST LOW 20 MIN: CPT | Performed by: FAMILY MEDICINE

## 2024-02-24 PROCEDURE — 3077F SYST BP >= 140 MM HG: CPT | Performed by: FAMILY MEDICINE

## 2024-02-24 ASSESSMENT — ENCOUNTER SYMPTOMS
FOCAL WEAKNESS: 0
FEVER: 0
TINGLING: 0
SENSORY CHANGE: 0

## 2024-02-24 ASSESSMENT — FIBROSIS 4 INDEX: FIB4 SCORE: 1.09

## 2024-02-24 NOTE — LETTER
[address] [place] [address]  Phone:  [phone] - Fax:  [phone]   [facility] Progress Report and Disability Certification  Date of Service: 2/24/2024   No Show:  No  Date / Time of Next Visit:     Claim Information   Patient Name: Mert Carl II  Claim Number:     Employer:   Date of Injury: 2/21/2024     Insurer / TPA: Georgette Juárez  ID / SSN:     Occupation: [employer] Employee  Diagnosis: The encounter diagnosis was Laceration of right upper extremity, subsequent encounter.    Medical Information   Related to Industrial Injury? Yes    Subjective Complaints:  Date of injury: 2/21/2024.  43-year-old [employer] employee presents in follow-up for right proximal forearm laceration sustained on 2/21/2024 when a steel edge cut the right forearm.  Initial visit on 2/21/2024 in which wound closure was performed.  Returns today on 2/24/2024 and reports no drainage from the wound, minimal redness on the periphery of the wound and sutures remain intact.  Denies focal weakness or functional limitations in the right forearm wrist or hand.  Is requesting to return to work full duty.   Objective Findings: Right forearm: 3 cm laceration right proximal forearm, 5 sutures intact, no drainage, no bleeding, no dehiscence, trace peripheral erythema with no induration, range of motion intact throughout in the right elbow, wrist, and throughout the hand with all fingers flexion and extension motor strength 5 out of 5 throughout, neurovascular intact throughout   Pre-Existing Condition(s):     Assessment:   Condition Improved    Status: Additional Care Required  Permanent Disability:No    Plan:   Comments:Advancement of work activity    Diagnostics:      Comments:       Disability Information   Status: Released to Full Duty    From:     Through:   Restrictions are:     Physical Restrictions   Sitting:    Standing:    Stooping:    Bending:       Squatting:    Walking:    Climbing:    Pushing:      Pulling:    Other:    Reaching Above Shoulder (L):   Reaching Above Shoulder (R):       Reaching Below Shoulder (L):    Reaching Below Shoulder (R):      Not to exceed Weight Limits   Carrying(hrs):   Weight Limit(lb):   Lifting(hrs):   Weight  Limit(lb):     Comments:      Repetitive Actions   Hands: i.e. Fine Manipulations from Grasping:     Feet: i.e. Operating Foot Controls:     Driving / Operate Machinery:     Health Care Provider’s Original or Electronic Signature  Asaf Lafleur M.D. Health Care Provider’s Original or Electronic Signature    Maxwell Vera DO MPH     Clinic Name / Location: [place] [address]  [place] [address] Clinic Phone Number: Dept: [phone]   Appointment Time: 12:00 Pm Visit Start Time: 11:59 AM   Check-In Time:  11:54 Am Visit Discharge Time:     Original-Treating Physician or Chiropractor    Page 2-Insurer/TPA    Page 3-Employer    Page 4-Employee

## 2024-02-24 NOTE — PROGRESS NOTES
Subjective:   Mert Carl II is a 43 y.o. male who presents for Arm Injury (Wound check WC f/u  )    Date of injury: 2/21/2024.  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  Initial visit on 2/21/2024 in which wound closure was performed.  Returns today on 2/24/2024 and reports no drainage from the wound, minimal redness on the periphery of the wound and sutures remain intact.  Denies focal weakness or functional limitations in the right forearm wrist or hand.  Is requesting to return to work full duty.   See the D39 form    Arm Injury  Pertinent negatives include no fever.     PMH:  has a past medical history of Chickenpox.  MEDS:   Current Outpatient Medications:     cetirizine (ZYRTEC) 10 MG Tab, Take 1 tablet (10 mg) by mouth daily for 90 days., Disp: 90 Tablet, Rfl: 1    omeprazole (PRILOSEC) 40 MG delayed-release capsule, Take 1 capsule (40 mg) by mouth every morning before breakfast., Disp: 90 Capsule, Rfl: 3    cabergoline (DOSTINEX) 0.5 MG tablet, Take 1 Tablet by mouth every 7 days., Disp: 12 Tablet, Rfl: 2    amLODIPine (NORVASC) 2.5 MG Tab, Take 1 Tablet by mouth every day., Disp: 90 Tablet, Rfl: 2    escitalopram (LEXAPRO) 10 MG Tab, Take 1 tablet (10 mg) by mouth once daily for 90 days., Disp: 90 Tablet, Rfl: 2    MULTIPLE VITAMINS-MINERALS ER PO, Take 50 mg by mouth every day., Disp: , Rfl:     bromocriptine (PARLODEL) 2.5 MG Tab, Take 1 Tablet by mouth every day., Disp: , Rfl:     escitalopram (LEXAPRO) 10 MG Tab, Take 1 Tablet by mouth every day., Disp: , Rfl:     Zinc Sulfate (ZINC 15 PO), zinc, Disp: , Rfl:     ZINC GLUCONATE PO, Take  by mouth., Disp: , Rfl:   ALLERGIES: No Known Allergies  SURGHX: History reviewed. No pertinent surgical history.  SOCHX:  reports that he has never smoked. He has never used smokeless tobacco. He reports current alcohol use. He reports that he  "does not currently use drugs.  FH:   Family History   Problem Relation Age of Onset    Sleep Apnea Neg Hx      Review of Systems   Constitutional:  Negative for fever.   Neurological:  Negative for tingling, sensory change and focal weakness.   All other systems reviewed and are negative.       Objective:   BP (!) 152/102   Pulse 62   Temp 37.3 °C (99.1 °F) (Temporal)   Resp 16   Ht 1.753 m (5' 9\")   Wt 74.8 kg (165 lb)   SpO2 98%   BMI 24.37 kg/m²   Physical Exam  Vitals and nursing note reviewed.   Constitutional:       General: He is not in acute distress.     Appearance: He is well-developed.   HENT:      Head: Normocephalic and atraumatic.      Right Ear: External ear normal.      Left Ear: External ear normal.      Nose: Nose normal.      Mouth/Throat:      Mouth: Mucous membranes are moist.   Eyes:      Conjunctiva/sclera: Conjunctivae normal.   Cardiovascular:      Rate and Rhythm: Normal rate.   Pulmonary:      Effort: Pulmonary effort is normal. No respiratory distress.      Breath sounds: Normal breath sounds.   Abdominal:      General: There is no distension.   Musculoskeletal:         General: Normal range of motion.   Skin:     General: Skin is warm and dry.   Neurological:      General: No focal deficit present.      Mental Status: He is alert and oriented to person, place, and time. Mental status is at baseline.      Gait: Gait (gait at baseline) normal.   Psychiatric:         Judgment: Judgment normal.       Right forearm: 3 cm laceration right proximal forearm, 5 sutures intact, no drainage, no bleeding, no dehiscence, trace peripheral erythema with no induration, range of motion intact throughout in the right elbow, wrist, and throughout the hand with all fingers flexion and extension motor strength 5 out of 5 throughout, neurovascular intact throughout   Assessment/Plan:   1. Laceration of right upper extremity, subsequent encounter    See the D39 form.  Recommend continue wound care with " current management plan, full duty as tolerated, recommend return to clinic in 7 days for suture removal and anticipate release from care.    Medical Decision Making/Course:  In the course of preparing for this visit with review of the pertinent past medical history, recent and past clinic visits, current medications, and performing chart, immunization, medical history and medication reconciliation, and in the further course of obtaining the current history pertinent to the clinic visit today, performing an exam and evaluation, ordering and independently evaluating labs, tests  , and/or procedures, prescribing any recommended new medications as noted above, providing any pertinent counseling and education and recommending further coordination of care including recommendation for advancement work activity, at least  21 minutes of total time were spent during this encounter.          Please note that this dictation was created using voice recognition software. I have worked with consultants from the vendor as well as technical experts from [facility] to optimize the interface. I have made every reasonable attempt to correct obvious errors, but I expect that there are errors of grammar and possibly content that I did not discover before finalizing the note.

## 2024-03-02 ENCOUNTER — HOSPITAL ENCOUNTER (OUTPATIENT)
Dept: LAB | Facility: MEDICAL CENTER | Age: 44
End: 2024-03-02
Payer: COMMERCIAL

## 2024-03-02 ENCOUNTER — OCCUPATIONAL MEDICINE (OUTPATIENT)
Dept: URGENT CARE | Facility: CLINIC | Age: 44
End: 2024-03-02
Payer: COMMERCIAL

## 2024-03-02 VITALS
HEIGHT: 69 IN | TEMPERATURE: 97.8 F | SYSTOLIC BLOOD PRESSURE: 118 MMHG | HEART RATE: 66 BPM | WEIGHT: 163.6 LBS | DIASTOLIC BLOOD PRESSURE: 54 MMHG | OXYGEN SATURATION: 96 % | BODY MASS INDEX: 24.23 KG/M2 | RESPIRATION RATE: 14 BRPM

## 2024-03-02 DIAGNOSIS — Z02.6 ENCOUNTER RELATED TO WORKER'S COMPENSATION CLAIM: ICD-10-CM

## 2024-03-02 DIAGNOSIS — S41.111D LACERATION OF RIGHT UPPER EXTREMITY, SUBSEQUENT ENCOUNTER: ICD-10-CM

## 2024-03-02 DIAGNOSIS — E22.1 HYPERPROLACTINEMIA (HCC): ICD-10-CM

## 2024-03-02 DIAGNOSIS — L03.113 CELLULITIS OF RIGHT UPPER EXTREMITY: ICD-10-CM

## 2024-03-02 LAB
ALBUMIN SERPL BCP-MCNC: 4.7 G/DL (ref 3.2–4.9)
ALBUMIN/GLOB SERPL: 1.6 G/DL
ALP SERPL-CCNC: 66 U/L (ref 30–99)
ALT SERPL-CCNC: 19 U/L (ref 2–50)
ANION GAP SERPL CALC-SCNC: 14 MMOL/L (ref 7–16)
AST SERPL-CCNC: 22 U/L (ref 12–45)
BILIRUB SERPL-MCNC: 0.6 MG/DL (ref 0.1–1.5)
BUN SERPL-MCNC: 14 MG/DL (ref 8–22)
CALCIUM ALBUM COR SERPL-MCNC: 8.9 MG/DL (ref 8.5–10.5)
CALCIUM SERPL-MCNC: 9.5 MG/DL (ref 8.5–10.5)
CHLORIDE SERPL-SCNC: 104 MMOL/L (ref 96–112)
CO2 SERPL-SCNC: 23 MMOL/L (ref 20–33)
CREAT SERPL-MCNC: 0.83 MG/DL (ref 0.5–1.4)
GFR SERPLBLD CREATININE-BSD FMLA CKD-EPI: 111 ML/MIN/1.73 M 2
GLOBULIN SER CALC-MCNC: 2.9 G/DL (ref 1.9–3.5)
GLUCOSE SERPL-MCNC: 87 MG/DL (ref 65–99)
POTASSIUM SERPL-SCNC: 4.1 MMOL/L (ref 3.6–5.5)
PROLACTIN SERPL-MCNC: 3.88 NG/ML (ref 2.1–17.7)
PROT SERPL-MCNC: 7.6 G/DL (ref 6–8.2)
SODIUM SERPL-SCNC: 141 MMOL/L (ref 135–145)
T4 FREE SERPL-MCNC: 0.79 NG/DL (ref 0.93–1.7)
TSH SERPL DL<=0.005 MIU/L-ACNC: 1.52 UIU/ML (ref 0.38–5.33)

## 2024-03-02 PROCEDURE — 3078F DIAST BP <80 MM HG: CPT | Performed by: PHYSICIAN ASSISTANT

## 2024-03-02 PROCEDURE — 99213 OFFICE O/P EST LOW 20 MIN: CPT | Mod: 24 | Performed by: PHYSICIAN ASSISTANT

## 2024-03-02 PROCEDURE — 84146 ASSAY OF PROLACTIN: CPT

## 2024-03-02 PROCEDURE — 80053 COMPREHEN METABOLIC PANEL: CPT

## 2024-03-02 PROCEDURE — 84305 ASSAY OF SOMATOMEDIN: CPT

## 2024-03-02 PROCEDURE — 3074F SYST BP LT 130 MM HG: CPT | Performed by: PHYSICIAN ASSISTANT

## 2024-03-02 PROCEDURE — 84439 ASSAY OF FREE THYROXINE: CPT

## 2024-03-02 PROCEDURE — 84443 ASSAY THYROID STIM HORMONE: CPT

## 2024-03-02 PROCEDURE — 36415 COLL VENOUS BLD VENIPUNCTURE: CPT

## 2024-03-02 RX ORDER — CEPHALEXIN 500 MG/1
500 CAPSULE ORAL 4 TIMES DAILY
Qty: 28 CAPSULE | Refills: 0 | Status: SHIPPED | OUTPATIENT
Start: 2024-03-02 | End: 2024-03-09

## 2024-03-02 ASSESSMENT — FIBROSIS 4 INDEX: FIB4 SCORE: 1.09

## 2024-03-02 NOTE — LETTER
[place] [address] [facility] [address] [address]  Phone:  [phone] - Fax:  [phone]   [facility] Progress Report and Disability Certification  Date of Service: 3/2/2024   No Show:  No  Date / Time of Next Visit: 3/6/2024 6:00 PM   Claim Information   Patient Name: Mert Carl II  Claim Number:     Employer:   [employer] Date of Injury: 2/21/2024     Insurer / TPA: Georgette Juárez  ID / SSN:     Occupation: [employer] Employee  Diagnosis: Diagnoses of Cellulitis of right upper extremity, Laceration of right upper extremity, subsequent encounter, and Encounter related to worker's compensation claim were pertinent to this visit.    Medical Information   Related to Industrial Injury? Yes    Subjective Complaints:  Copied from last visit:  Date of injury: 2/21/2024.  43-year-old [employer] employee presents in follow-up for right proximal forearm laceration sustained on 2/21/2024 when a steel edge cut the right forearm.  Initial visit on 2/21/2024 in which wound closure was performed.  Returns today on 2/24/2024 and reports no drainage from the wound, minimal redness on the periphery of the wound and sutures remain intact.  Denies focal weakness or functional limitations in the right forearm wrist or hand.  Is requesting to return to work full duty.   See the D39 form    Update 3/2/2024: Patient returns today with concerns for surrounding redness and mild wound dehiscence.  No drainage.  No fevers or chills.  Has been tolerating full duty keeping the incision covered while at work.  Denies fevers or chills.  No constitutional symptoms.   Objective Findings:   Right forearm: 3 cm laceration right proximal forearm,4 sutures intact, it appears one of the sutures has come out.  There is mild dehiscence noted to the medial side of the laceration.  There is mild surrounding erythema without exudate.   range of motion intact throughout in the right elbow,  wrist, and throughout the hand with all fingers flexion and extension motor strength 5 out of 5 throughout, neurovascular intact throughout       Pre-Existing Condition(s):     Assessment:   Condition Same    Status: Additional Care Required  Permanent Disability:No    Plan: Medication  Comments:Keflex    Diagnostics:      Comments:       Disability Information   Status: Released to Full Duty    From:  3/2/2024  Through: 3/6/2024 Restrictions are: Temporary   Physical Restrictions   Sitting:    Standing:    Stooping:    Bending:      Squatting:    Walking:    Climbing:    Pushing:      Pulling:    Other:    Reaching Above Shoulder (L):   Reaching Above Shoulder (R):       Reaching Below Shoulder (L):    Reaching Below Shoulder (R):      Not to exceed Weight Limits   Carrying(hrs):   Weight Limit(lb):   Lifting(hrs):   Weight  Limit(lb):     Comments: Return to full duty, keep wound clean dry and covered while at work otherwise no restrictions  Trace wound erythema/cellulitis with medial wound dehiscence  Elected to leave sutures in place and start Keflex  Wound care discussed, dressing supplies provided  Keep wound clean and dry and covered while at work  Okay to wash with soap and water  Follow-up in 3 days for suture removal, sooner if wound issues present themselves as discussed    Repetitive Actions   Hands: i.e. Fine Manipulations from Grasping:     Feet: i.e. Operating Foot Controls:     Driving / Operate Machinery:     Health Care Provider’s Original or Electronic Signature  Elisa Polk P.A.-C. Health Care Provider’s Original or Electronic Signature    Maxwell Vera DO MPH     Clinic Name / Location: [place] [address]  [place] [address] [address] Clinic Phone Number: Dept: [phone]   Appointment Time: 8:30 Am Visit Start Time: 8:32 AM   Check-In Time:  8:29 Am Visit Discharge Time:  8:58 AM   Original-Treating Physician or Chiropractor    Page 2-Insurer/TPA    Page 3-Employer     Page 4-Employee

## 2024-03-02 NOTE — PROGRESS NOTES
Subjective:   Mert Carl II is a 43 y.o. male who presents for No chief complaint on file.         HPI      ROS    Medications:  amLODIPine Tabs  bromocriptine Tabs  cabergoline  cetirizine Tabs  escitalopram Tabs  MULTIPLE VITAMINS-MINERALS ER PO  omeprazole  ZINC 15 PO  ZINC GLUCONATE PO    Allergies:             Patient has no known allergies.    Surgical History:       No past surgical history on file.    Past Social Hx:  Mert Carl II  reports that he has never smoked. He has never used smokeless tobacco. He reports current alcohol use. He reports that he does not currently use drugs.     Past Family Hx:   Mert Carl II family history is not on file.       Problem list, medications, and allergies reviewed by myself today in Epic.     Objective:     There were no vitals taken for this visit.    Physical Exam    Assessment/Plan:     Diagnosis and Associated Orders:     There are no diagnoses linked to this encounter.      Comments/MDM:    I personally reviewed prior external notes and test results pertinent to today's visit. Supportive care, natural history, differential diagnoses, and indications for immediate follow-up discussed. Return to clinic or go to ED if symptoms worsen or persist.  Red flag symptoms discussed.  Patient/Parent/Guardian voices understanding. Follow-up with your primary care provider in 3-5 days.  All side effects of medication discussed including allergic response, GI upset, tendon injury, rash, sedation etc    Please note that this dictation was created using voice recognition software. I have made a reasonable attempt to correct obvious errors, but I expect that there are errors of grammar and possibly content that I did not discover before finalizing the note.    This note was electronically signed by Elisa Polk PA-C

## 2024-03-02 NOTE — PROGRESS NOTES
"Subjective     Mahin Junior Siddhartha CHU is a 43 y.o. male who presents with Follow-Up (Workers comp )      Copied from last visit:  Date of injury: 2/21/2024.  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  Initial visit on 2/21/2024 in which wound closure was performed.  Returns today on 2/24/2024 and reports no drainage from the wound, minimal redness on the periphery of the wound and sutures remain intact.  Denies focal weakness or functional limitations in the right forearm wrist or hand.  Is requesting to return to work full duty.   See the D39 form    Update 3/2/2024: Patient returns today with concerns for surrounding redness and mild wound dehiscence.  No drainage.  No fevers or chills.  Has been tolerating full duty keeping the incision covered while at work.  Denies fevers or chills.  No constitutional symptoms.     HPI    ROS           Objective     /54 (BP Location: Left arm, Patient Position: Sitting)   Pulse 66   Temp 36.6 °C (97.8 °F) (Temporal)   Resp 14   Ht 1.753 m (5' 9\")   Wt 74.2 kg (163 lb 9.6 oz)   SpO2 96%   BMI 24.16 kg/m²      Physical Exam  Vitals and nursing note reviewed.   Constitutional:       General: He is not in acute distress.     Appearance: Normal appearance. He is not ill-appearing.   HENT:      Head: Normocephalic.   Eyes:      Extraocular Movements: Extraocular movements intact.      Pupils: Pupils are equal, round, and reactive to light.   Cardiovascular:      Rate and Rhythm: Normal rate.   Pulmonary:      Effort: Pulmonary effort is normal.   Skin:     General: Skin is warm.      Findings: No rash.   Neurological:      Mental Status: He is alert and oriented to person, place, and time.   Psychiatric:         Thought Content: Thought content normal.         Judgment: Judgment normal.           Right forearm: 3 cm laceration right proximal forearm,4 " sutures intact, it appears one of the sutures has come out.  There is mild dehiscence noted to the medial side of the laceration.  There is mild surrounding erythema without exudate.   range of motion intact throughout in the right elbow, wrist, and throughout the hand with all fingers flexion and extension motor strength 5 out of 5 throughout, neurovascular intact throughout                       Assessment & Plan        1. Cellulitis of right upper extremity    - cephALEXin (KEFLEX) 500 MG Cap; Take 1 Capsule by mouth 4 times a day for 7 days.  Dispense: 28 Capsule; Refill: 0    2. Laceration of right upper extremity, subsequent encounter      3. Encounter related to worker's compensation claim       Return to full duty, keep wound clean dry and covered while at work otherwise no restrictions  Trace wound erythema/cellulitis with medial wound dehiscence  Elected to leave sutures in place and start Keflex  Wound care discussed, dressing supplies provided  Keep wound clean and dry and covered while at work  Okay to wash with soap and water  Follow-up in 3 days for suture removal, sooner if wound issues present themselves as discussed              23-Dec-2020 00:57

## 2024-03-06 ENCOUNTER — OCCUPATIONAL MEDICINE (OUTPATIENT)
Dept: URGENT CARE | Facility: CLINIC | Age: 44
End: 2024-03-06
Payer: COMMERCIAL

## 2024-03-06 VITALS
SYSTOLIC BLOOD PRESSURE: 138 MMHG | HEIGHT: 69 IN | HEART RATE: 65 BPM | OXYGEN SATURATION: 95 % | BODY MASS INDEX: 24.14 KG/M2 | WEIGHT: 163 LBS | RESPIRATION RATE: 12 BRPM | TEMPERATURE: 97 F | DIASTOLIC BLOOD PRESSURE: 80 MMHG

## 2024-03-06 DIAGNOSIS — L03.113 CELLULITIS OF RIGHT UPPER EXTREMITY: ICD-10-CM

## 2024-03-06 DIAGNOSIS — Z48.02 VISIT FOR SUTURE REMOVAL: ICD-10-CM

## 2024-03-06 LAB
IGF-I SERPL-MCNC: 247 NG/ML (ref 78–233)
IGF-I Z-SCORE SERPL: 2.1

## 2024-03-06 PROCEDURE — 3075F SYST BP GE 130 - 139MM HG: CPT | Performed by: PHYSICIAN ASSISTANT

## 2024-03-06 PROCEDURE — 3079F DIAST BP 80-89 MM HG: CPT | Performed by: PHYSICIAN ASSISTANT

## 2024-03-06 PROCEDURE — 99213 OFFICE O/P EST LOW 20 MIN: CPT | Performed by: PHYSICIAN ASSISTANT

## 2024-03-06 ASSESSMENT — ENCOUNTER SYMPTOMS
CHILLS: 0
MYALGIAS: 0
FEVER: 0

## 2024-03-06 ASSESSMENT — FIBROSIS 4 INDEX: FIB4 SCORE: 1.03

## 2024-03-06 NOTE — LETTER
[facility] [place] [id number] [facility] [address] [address]  Phone:  [phone] - Fax:  [phone]   [facility] Progress Report and Disability Certification  Date of Service: 3/6/2024   No Show:  No  Date / Time of Next Visit:     Claim Information   Patient Name: Mert Carl II  Claim Number:     Employer: [employer]  Date of Injury: 2/21/2024     Insurer / TPA: Georgette Juárez  ID / SSN:     Occupation: Warehouse Employee  Diagnosis: There were no encounter diagnoses.    Medical Information   Related to Industrial Injury? Yes    Subjective Complaints:  HPI:  DOI: 2/21/2024  This is a very pleasant 43-year-old male presented to the clinic for follow-up regarding a recent work-related injury.  Patient sustained a right forearm laceration and had sutures placed.  He was seen 3 days ago and developed a mild infection and has since been taking Keflex.  Pain and redness have improved.  Patient states his dog accidentally ripped out a few of the sutures currently has 2 sutures in place.  Not experiencing any limitations in range of motion of the extremity.  Has been working full duty without complication.  No fevers, chills, nausea or vomiting.   Objective Findings: Constitutional: Pt is oriented to person, place, and time.  Appears well-developed and well-nourished. No distress.   Eyes: Conjunctivae are normal.   Cardiovascular: Normal rate.    Pulmonary/Chest: Effort normal.   Musculoskeletal: Right forearm: 3 cm linear laceration to the dorsal aspect of the proximal right forearm.  2 sutures in place.  There is central wound dehiscence with granulation tissue present.  Very minimal surrounding erythema without drainage.  Maintains full and pain-free range of motion of the extremity.  Neurological: Pt is alert and oriented to person, place, and time. Coordination normal.   Skin: Skin is warm. Pt is not diaphoretic. No erythema.   Psychiatric: Pt has a normal mood and  affect.  Behavior is normal.      Pre-Existing Condition(s):     Assessment:   Condition Improved    Status: Discharged /  MMI  Permanent Disability:No    Plan:      Diagnostics:      Comments:       Disability Information   Status: Released to Full Duty    From:  3/6/2024  Through:   Restrictions are:     Physical Restrictions   Sitting:    Standing:    Stooping:    Bending:      Squatting:    Walking:    Climbing:    Pushing:      Pulling:    Other:    Reaching Above Shoulder (L):   Reaching Above Shoulder (R):       Reaching Below Shoulder (L):    Reaching Below Shoulder (R):      Not to exceed Weight Limits   Carrying(hrs):   Weight Limit(lb):   Lifting(hrs):   Weight  Limit(lb):     Comments: Patient released to return to full duty.  2 simple interrupted sutures were removed in clinic without complication.  Patient's dog ripped out his other remaining sutures.  Advised continuing with Keflex as prescribed.  Continued wound care instructions discussed with return precautions for any signs of worsening infection.  Discharge MMI.    Repetitive Actions   Hands: i.e. Fine Manipulations from Grasping:     Feet: i.e. Operating Foot Controls:     Driving / Operate Machinery:     Health Care Provider’s Original or Electronic Signature  Akin Guzman P.A.-C. Health Care Provider’s Original or Electronic Signature    Maxwell Vera DO MPH     Clinic Name / Location: [address] [facility]  [place] [address] Clinic Phone Number: Dept: [phone]   Appointment Time: 6:00 Pm Visit Start Time: 6:12 PM   Check-In Time:  5:44 Pm Visit Discharge Time: 7:01 PM    Original-Treating Physician or Chiropractor    Page 2-Insurer/TPA    Page 3-Employer    Page 4-Employee

## 2024-03-07 NOTE — PROGRESS NOTES
"Subjective     Mahin Carl II is a 43 y.o. male who presents with Follow-Up ( FU R arm)      HPI:  DOI: 2/21/2024  This is a very pleasant 43-year-old male presented to the clinic for follow-up regarding a recent work-related injury.  Patient sustained a right forearm laceration and had sutures placed.  He was seen 3 days ago and developed a mild infection and has since been taking Keflex.  Pain and redness have improved.  Patient states his dog accidentally ripped out a few of the sutures currently has 2 sutures in place.  Not experiencing any limitations in range of motion of the extremity.  Has been working full duty without complication.  No fevers, chills, nausea or vomiting.       Review of Systems   Constitutional:  Negative for chills and fever.   Musculoskeletal:  Negative for joint pain and myalgias.   Skin:         Healing laceration to right forearm          PMH:   No pertinent past medical history to this problem  MEDS:  Medications were reviewed in EMR  ALLERGIES:  Allergies were reviewed in EMR  FH:   No pertinent family history to this problem      Objective     /80   Pulse 65   Temp 36.1 °C (97 °F) (Temporal)   Resp 12   Ht 1.753 m (5' 9\")   Wt 73.9 kg (163 lb)   SpO2 95%   BMI 24.07 kg/m²      Physical Exam    Constitutional: Pt is oriented to person, place, and time.  Appears well-developed and well-nourished. No distress.   Eyes: Conjunctivae are normal.   Cardiovascular: Normal rate.    Pulmonary/Chest: Effort normal.   Musculoskeletal: Right forearm: 3 cm linear laceration to the dorsal aspect of the proximal right forearm.  2 sutures in place.  There is central wound dehiscence with granulation tissue present.  Very minimal surrounding erythema without drainage.  Maintains full and pain-free range of motion of the extremity.  Neurological: Pt is alert and oriented to person, place, and time. Coordination normal.   Skin: Skin is warm. Pt is not diaphoretic. No erythema. "   Psychiatric: Pt has a normal mood and affect.  Behavior is normal.              Assessment & Plan        1. Cellulitis of right upper extremity    2. Visit for suture removal    Patient released to return to full duty.  2 simple interrupted sutures were removed in clinic without complication.  Patient's dog ripped out his other remaining sutures.  Advised continuing with Keflex as prescribed.  Continued wound care instructions discussed with return precautions for any signs of worsening infection.  Discharge MMI.    Differential diagnosis, natural history, supportive care, and indications for immediate follow-up discussed at length.

## 2024-03-08 ENCOUNTER — TELEMEDICINE (OUTPATIENT)
Dept: ENDOCRINOLOGY | Facility: MEDICAL CENTER | Age: 44
End: 2024-03-08
Payer: COMMERCIAL

## 2024-03-08 ENCOUNTER — TELEPHONE (OUTPATIENT)
Dept: ENDOCRINOLOGY | Facility: MEDICAL CENTER | Age: 44
End: 2024-03-08

## 2024-03-08 VITALS — BODY MASS INDEX: 24.44 KG/M2 | HEIGHT: 69 IN | WEIGHT: 165 LBS

## 2024-03-08 DIAGNOSIS — E55.9 VITAMIN D DEFICIENCY: ICD-10-CM

## 2024-03-08 DIAGNOSIS — R79.89 ELEVATED GROWTH HORMONE (GH) LEVEL: ICD-10-CM

## 2024-03-08 DIAGNOSIS — E22.1 HYPERPROLACTINEMIA (HCC): ICD-10-CM

## 2024-03-08 RX ORDER — CABERGOLINE 0.5 MG/1
0.5 TABLET ORAL
Qty: 12 TABLET | Refills: 2 | Status: SHIPPED | OUTPATIENT
Start: 2024-03-08

## 2024-03-08 ASSESSMENT — FIBROSIS 4 INDEX: FIB4 SCORE: 1.03

## 2024-03-08 NOTE — PROGRESS NOTES
Chief Complaint:Follow-up on the following conditions  Patient was presented for a telehealth consultation via secure and encrypted videoconferencing technology. This encounter was conducted via Zoom . Verbal consent was obtained. Patient's identity was verified.   Subjective:    Mert Carl II is a 43 y.o. male     Hyperprolactinemia:  He was first diagnosed with a pituitary tumor in 11/05/2021.    Presenting symptoms which led to the investigation of the pituitary included hypogonadism as patient is trying to conceive with his spouse.    Initial MRI of the pituitary on 11/05/2021 revealed There is an approximately 3 to 4 mm sized questionable hypoenhancing area in the posterior portion of the pituitary gland. In view of hyperprolactinemia, this is suspicious for pituitary microadenoma..      Pituitary function testing revealed: Prolactin level elevated at 22.6, total testosterone 300.      He was prescribed Bromocriptine 2.5 mg daily by his fertility clinic initially    Currently using cabergoline 0.5 mg weekly  Patient has tolerated this therapy well and again is not symptomatic in any form.    He denies headache, denies vision difficulties, denies frequent urination, denies increased thirst, denies galactorrhea.  Last visual field checks was last year-patient is aware that this has to be done every year     Latest Reference Range & Units 02/10/23 08:21 05/10/23 07:09 03/02/24 08:10   Prolactin 2.10 - 17.70 ng/mL 2.67 1.81 (L) 3.88        Latest Reference Range & Units 03/02/24 08:10   Bun 8 - 22 mg/dL 14   Creatinine 0.50 - 1.40 mg/dL 0.83   GFR (CKD-EPI) >60 mL/min/1.73 m 2 111     He uses a C pap, he works nights-reports fatigue     Denies constipation, dry skin, losing hair, dry skin        Latest Reference Range & Units 03/02/24 08:10   TSH 0.380 - 5.330 uIU/mL 1.520   Free T-4 0.93 - 1.70 ng/dL 0.79 (L)       2.  Vitamin D deficiency:  Currently taking 4000iUS daily of vitamin D3   Latest  Reference Range & Units 02/10/23 08:21   25-Hydroxy   Vitamin D 25 30 - 100 ng/mL 57      Latest Reference Range & Units 5/16/22 13:54   25-Hydroxy   Vitamin D 25 30 - 80 ng/mL 19 (L)       3. Elevated growth hormone:   Latest Reference Range & Units 05/10/23 07:09 03/02/24 08:10   IGF1 78 - 233 ng/mL 194 247 (H)   IGF-1 Z Score Calculation  1.1 2.1         Patient's medications, allergies, and social histories were reviewed and updated as appropriate.    ROS:     CONS:     No fever, no chills, no weight loss, no fatigue   EYES:      No diplopia, no blurry vision, no redness of eyes, no swelling of eyelids   ENT:    No hearing loss, No ear pain, No sore throat, no dysphagia, no neck swelling   CV:     No chest pain, no palpitations, no claudication, no orthopnea, no PND   PULM:    No SOB, no cough, no hemoptysis, no wheezing    GI:   No nausea, no vomiting, no diarrhea, no constipation, no bloody stools   :  Passing urine well, no dysuria, no hematuria   ENDO:   No polyuria, no polydipsia, no heat intolerance, no cold intolerance   NEURO: No headaches, no dizziness, no convulsions, no tremors   MUSC:  No joint swellings, no arthralgias, no myalgias, no weakness   SKIN:   No rash, no ulcers, no dry skin   PSYCH:   No depression, no anxiety, no difficulty sleeping       Past Medical History:  Patient Active Problem List    Diagnosis Date Noted    Obstruction of paranasal sinus 02/08/2024    Blunt trauma of nose 02/08/2024    Daytime somnolence 02/08/2024    BMI 24.0-24.9, adult 02/08/2024    Obstructive sleep apnea 01/24/2023    Depression 04/22/2022    Pituitary adenoma ([facility]) 11/10/2021    Anxiety 09/16/2021    Hyperprolactinemia (HCC) 09/01/2021    Hypogonadism 09/01/2021    Absence of testicle in scrotum 08/16/2021    Male infertility 08/16/2021       Past Surgical History:  No past surgical history on file.     Allergies:  Patient has no known allergies.     Current Medications:    Current Outpatient  Medications:     cephALEXin (KEFLEX) 500 MG Cap, Take 1 Capsule by mouth 4 times a day for 7 days. (Patient not taking: Reported on 3/6/2024), Disp: 28 Capsule, Rfl: 0    cetirizine (ZYRTEC) 10 MG Tab, Take 1 tablet (10 mg) by mouth daily for 90 days., Disp: 90 Tablet, Rfl: 1    omeprazole (PRILOSEC) 40 MG delayed-release capsule, Take 1 capsule (40 mg) by mouth every morning before breakfast., Disp: 90 Capsule, Rfl: 3    cabergoline (DOSTINEX) 0.5 MG tablet, Take 1 Tablet by mouth every 7 days., Disp: 12 Tablet, Rfl: 2    amLODIPine (NORVASC) 2.5 MG Tab, Take 1 Tablet by mouth every day., Disp: 90 Tablet, Rfl: 2    escitalopram (LEXAPRO) 10 MG Tab, Take 1 tablet (10 mg) by mouth once daily for 90 days., Disp: 90 Tablet, Rfl: 2    MULTIPLE VITAMINS-MINERALS ER PO, Take 50 mg by mouth every day., Disp: , Rfl:     bromocriptine (PARLODEL) 2.5 MG Tab, Take 1 Tablet by mouth every day., Disp: , Rfl:     escitalopram (LEXAPRO) 10 MG Tab, Take 1 Tablet by mouth every day., Disp: , Rfl:     Zinc Sulfate (ZINC 15 PO), zinc, Disp: , Rfl:     ZINC GLUCONATE PO, Take  by mouth., Disp: , Rfl:     Social History:  Social History     Socioeconomic History    Marital status:      Spouse name: Not on file    Number of children: Not on file    Years of education: Not on file    Highest education level: Not on file   Occupational History    Not on file   Tobacco Use    Smoking status: Never    Smokeless tobacco: Never   Vaping Use    Vaping Use: Every day    Substances: Nicotine   Substance and Sexual Activity    Alcohol use: Not Currently     Comment: 2-3 a day     Drug use: Not Currently    Sexual activity: Not on file   Other Topics Concern    Not on file   Social History Narrative    Not on file     Social Determinants of Health     Financial Resource Strain: Not on file   Food Insecurity: Not on file   Transportation Needs: Not on file   Physical Activity: Not on file   Stress: Not on file   Social Connections: Not on  file   Intimate Partner Violence: Not on file   Housing Stability: Not on file        Family History:   Family History   Problem Relation Age of Onset    Sleep Apnea Neg Hx          PHYSICAL EXAM:   Vital signs:  There were no vitals filed for this visit.   GENERAL: Well-developed, well-nourished  in no apparent distress.       ASSESSMENT/PLAN:   1. Hyperprolactinemia (HCC)  Stable  Stable clinically and biochemically  We will continue cabergoline 0.5 mg weekly secondary to diagnosis #3  Discussed with patient that his prolactin and growth hormone are within range at next appointment then we can try to see if he can tolerate no cabergoline  If growth hormone remains elevated-then we will order MRI pituitary MRI    - PROLACTIN; Future  - FREE THYROXINE; Future  - TSH; Future  - cabergoline (DOSTINEX) 0.5 MG tablet; Take 1 Tablet by mouth every 7 days.  Dispense: 12 Tablet; Refill: 2    2. Vitamin D deficiency  Stable  Continue regimen-HPI    3. Elevated growth hormone (GH) level  Unstable  Sleep apnea  - IGF-1 SOMATOMEDIN; Future       Disposition: Make an appointment to follow-up in 3 months  Do your blood work 2 weeks prior to next appointment    Thank you kindly for allowing me to participate in the endocrine care plan for this patient.    Juan Alberto Hoffman A.P.R.N.  03/08/24          CC:   Caio Pérez M.D.

## 2024-03-18 ENCOUNTER — OFFICE VISIT (OUTPATIENT)
Dept: URGENT CARE | Facility: CLINIC | Age: 44
End: 2024-03-18
Payer: COMMERCIAL

## 2024-03-18 VITALS
RESPIRATION RATE: 13 BRPM | TEMPERATURE: 98.3 F | SYSTOLIC BLOOD PRESSURE: 138 MMHG | OXYGEN SATURATION: 98 % | BODY MASS INDEX: 24.34 KG/M2 | HEART RATE: 69 BPM | DIASTOLIC BLOOD PRESSURE: 82 MMHG | HEIGHT: 69 IN | WEIGHT: 164.3 LBS

## 2024-03-18 DIAGNOSIS — J02.9 PHARYNGITIS, UNSPECIFIED ETIOLOGY: ICD-10-CM

## 2024-03-18 DIAGNOSIS — J06.9 VIRAL URI: ICD-10-CM

## 2024-03-18 LAB — S PYO DNA SPEC NAA+PROBE: NOT DETECTED

## 2024-03-18 PROCEDURE — 3075F SYST BP GE 130 - 139MM HG: CPT | Performed by: NURSE PRACTITIONER

## 2024-03-18 PROCEDURE — 3079F DIAST BP 80-89 MM HG: CPT | Performed by: NURSE PRACTITIONER

## 2024-03-18 PROCEDURE — 87651 STREP A DNA AMP PROBE: CPT | Performed by: NURSE PRACTITIONER

## 2024-03-18 PROCEDURE — 99213 OFFICE O/P EST LOW 20 MIN: CPT | Performed by: NURSE PRACTITIONER

## 2024-03-18 RX ORDER — LIDOCAINE HYDROCHLORIDE 20 MG/ML
15 SOLUTION OROPHARYNGEAL
Qty: 100 ML | Refills: 0 | Status: SHIPPED | OUTPATIENT
Start: 2024-03-18

## 2024-03-18 ASSESSMENT — FIBROSIS 4 INDEX: FIB4 SCORE: 1.03

## 2024-03-18 ASSESSMENT — ENCOUNTER SYMPTOMS
HEARTBURN: 0
CONSTITUTIONAL NEGATIVE: 1
FEVER: 0
COUGH: 1
TROUBLE SWALLOWING: 1
SHORTNESS OF BREATH: 0
SORE THROAT: 1
CARDIOVASCULAR NEGATIVE: 1

## 2024-03-18 ASSESSMENT — VISUAL ACUITY: OU: 1

## 2024-03-18 NOTE — LETTER
March 18, 2024         Patient: Mert Carl II   YOB: 1980   Date of Visit: 3/18/2024           To Whom it May Concern:    Mert Carl was seen in my clinic on 3/18/2024 due to illness. Due to medical necessity, please excuse patient from work 3/18 as well as for the next 3 days as needed.     If you have any questions or concerns, please don't hesitate to call.        Sincerely,         DENIS Guerrero.  Electronically Signed

## 2024-03-18 NOTE — PROGRESS NOTES
Subjective:     Mert Carl II is a 43 y.o. male who presents for Pharyngitis (X 2 day)       Pharyngitis   This is a new problem. The current episode started today. The problem has been gradually worsening. Associated symptoms include coughing (Minimal) and trouble swallowing (Painful, right side). Pertinent negatives include no congestion or shortness of breath. Treatments tried: Throat coat tea.     Review of Systems   Constitutional: Negative.  Negative for fever.   HENT:  Positive for sore throat and trouble swallowing (Painful, right side). Negative for congestion.    Respiratory:  Positive for cough (Minimal). Negative for shortness of breath.    Cardiovascular: Negative.    Gastrointestinal:  Negative for heartburn.   All other systems reviewed and are negative.    Refer to HPI for additional details.    During this visit, appropriate PPE was worn, and hand hygiene was performed.    PMH:  has a past medical history of Chickenpox.    MEDS:   Current Outpatient Medications:     cabergoline (DOSTINEX) 0.5 MG tablet, Take 1 Tablet by mouth every 7 days., Disp: 12 Tablet, Rfl: 2    omeprazole (PRILOSEC) 40 MG delayed-release capsule, Take 1 capsule (40 mg) by mouth every morning before breakfast., Disp: 90 Capsule, Rfl: 3    amLODIPine (NORVASC) 2.5 MG Tab, Take 1 Tablet by mouth every day., Disp: 90 Tablet, Rfl: 2    MULTIPLE VITAMINS-MINERALS ER PO, Take 50 mg by mouth every day., Disp: , Rfl:     bromocriptine (PARLODEL) 2.5 MG Tab, Take 1 Tablet by mouth every day., Disp: , Rfl:     escitalopram (LEXAPRO) 10 MG Tab, Take 1 Tablet by mouth every day., Disp: , Rfl:     escitalopram (LEXAPRO) 10 MG Tab, Take 1 tablet (10 mg) by mouth once daily for 90 days., Disp: 90 Tablet, Rfl: 2    ALLERGIES: No Known Allergies  SURGHX: History reviewed. No pertinent surgical history.  SOCHX:  reports that he has never smoked. He has never used smokeless tobacco. He reports that he does not currently use alcohol.  "He reports that he does not currently use drugs.    FH: Per HPI as applicable/pertinent.      Objective:     /82 (BP Location: Left arm, Patient Position: Sitting, BP Cuff Size: Adult)   Pulse 69   Temp 36.8 °C (98.3 °F)   Resp 13   Ht 1.753 m (5' 9\")   Wt 74.5 kg (164 lb 4.8 oz)   SpO2 98%   BMI 24.26 kg/m²     Physical Exam  Nursing note reviewed.   Constitutional:       General: He is not in acute distress.     Appearance: He is well-developed. He is not ill-appearing or toxic-appearing.   HENT:      Mouth/Throat:      Mouth: Mucous membranes are moist.      Pharynx: Uvula midline. Pharyngeal swelling and posterior oropharyngeal erythema present.   Eyes:      General: Vision grossly intact.   Neck:      Trachea: Phonation normal.   Cardiovascular:      Rate and Rhythm: Normal rate.   Pulmonary:      Effort: Pulmonary effort is normal. No respiratory distress.   Musculoskeletal:         General: No deformity. Normal range of motion.      Cervical back: Neck supple.   Skin:     General: Skin is warm and dry.      Coloration: Skin is not pale.   Neurological:      Mental Status: He is alert and oriented to person, place, and time.      Motor: No weakness.   Psychiatric:         Behavior: Behavior normal. Behavior is cooperative.     POCT Cepheid Group A Strep by PCR: negative      Assessment/Plan:     1. Pharyngitis, unspecified etiology  - POCT CEPHEID GROUP A STREP - PCR    2. Viral URI    Discussed likely self-limiting viral etiology and expected course and duration of illness. Vital signs stable, afebrile, no acute distress at this time.    Emphasize supportive measures, rest, fluids, gargles, and symptom management with over-the-counter medication as needed such as ibuprofen, acetaminophen, lozenges, and sprays. Rx as above sent electronically if needed.    Standard precautions/mask/wash hands.    Monitor. Return precautions advised.     Differential diagnosis, natural history, supportive care, " over-the-counter symptom management per 's instructions, close monitoring, and indications for immediate follow-up discussed.     All questions answered. Patient agrees with the plan of care.    Work note provided.     Discharge summary provided via [employer].

## 2024-04-24 ENCOUNTER — SLEEP STUDY (OUTPATIENT)
Dept: SLEEP MEDICINE | Facility: MEDICAL CENTER | Age: 44
End: 2024-04-24
Attending: NURSE PRACTITIONER
Payer: COMMERCIAL

## 2024-04-24 DIAGNOSIS — R40.0 DAYTIME SOMNOLENCE: ICD-10-CM

## 2024-04-24 DIAGNOSIS — G47.33 OBSTRUCTIVE SLEEP APNEA: ICD-10-CM

## 2024-04-24 PROCEDURE — 95811 POLYSOM 6/>YRS CPAP 4/> PARM: CPT | Performed by: STUDENT IN AN ORGANIZED HEALTH CARE EDUCATION/TRAINING PROGRAM

## 2024-04-25 ENCOUNTER — HOSPITAL ENCOUNTER (OUTPATIENT)
Dept: LAB | Facility: MEDICAL CENTER | Age: 44
End: 2024-04-25
Payer: COMMERCIAL

## 2024-04-25 DIAGNOSIS — R79.89 ELEVATED GROWTH HORMONE (GH) LEVEL: ICD-10-CM

## 2024-04-25 DIAGNOSIS — E22.1 HYPERPROLACTINEMIA (HCC): ICD-10-CM

## 2024-04-25 LAB
PROLACTIN SERPL-MCNC: 2.58 NG/ML (ref 2.1–17.7)
T4 FREE SERPL-MCNC: 0.8 NG/DL (ref 0.93–1.7)
TSH SERPL DL<=0.005 MIU/L-ACNC: 0.64 UIU/ML (ref 0.38–5.33)

## 2024-04-25 PROCEDURE — 84146 ASSAY OF PROLACTIN: CPT

## 2024-04-25 PROCEDURE — 84305 ASSAY OF SOMATOMEDIN: CPT

## 2024-04-25 PROCEDURE — 84439 ASSAY OF FREE THYROXINE: CPT

## 2024-04-25 PROCEDURE — 36415 COLL VENOUS BLD VENIPUNCTURE: CPT

## 2024-04-25 PROCEDURE — 84443 ASSAY THYROID STIM HORMONE: CPT

## 2024-04-26 NOTE — PROCEDURES
Patient: GLORIA OLGUIN  ID: [id number] Date: 4/24/2024   MONTAGE: Standard  STUDY TYPE: Treatment  RECORDING TECHNIQUE:   After the scalp was prepared, gold plated electrodes were applied to the scalp according to the International 10-20 System. EEG (electroencephalogram) was continuously monitored from the O1-M2, O2-M1, C3-M2, C4-M1, F3-M2, and F4-M1. EOGs (electrooculograms) were monitored by electrodes placed at the left and right outer canthi. Chin EMG (electromyogram) was monitored by electrodes placed on the mentalis and sub-mentalis muscles. Nasal and oral airflow were monitored using a triple port thermocouple as well as oronasal pressure transducer. Respiratory effort was measured by inductive plethysmography technology employing abdominal and thoracic belts. Blood oxygen saturation and pulse were monitored by pulse oximetry. Heart rhythm was monitored by surface electrocardiogram. Leg EMG was studied using surface electrodes placed on left and right anterior tibialis. A microphone was used to monitor tracheal sounds and snoring. Body position was monitored and documented by technician observation.   SCORING CRITERIA:   A modification of the AASM manual for scoring of sleep and associated events was used. Obstructive apneas were scored by cessation of airflow for at least 10 seconds with continuing respiratory effort. Central apneas were scored by cessation of airflow for at least 10 seconds with no respiratory effort. Hypopneas were scored by a 30% or more reduction in airflow for at least 10 seconds accompanied by arterial oxygen desaturation of 3% or an arousal. For CMS (Medicare) patients, per AASM rule 1B, hypopneas are scored by 30% with mild reduction in airflow for at least 10 seconds accompanied by arterial saturation decreased at 4%.  Study start time was 10:03:40 PM. Diagnostic recording time was 8h 0.0m with a total sleep time of 6h 54.5m resulting in a sleep efficiency of 86.35%%. Sleep latency  from the start of the study was 01 minutes and the latency from sleep to REM was 70 minutes. In total,299 arousals were scored for an arousal index of 43.3.  Respiratory:  There were a total of 10 apneas consisting of 0 obstructive apneas, 0 mixed apneas, and 10 central apneas. A total of 52 hypopneas were scored. The apnea index was 1.45 per hour and the hypopnea index was 7.53 per hour resulting in an overall AHI of 8.97. AHI during REM was 7.8 and AHI while supine was 8.87.  Oximetry:  There was a mean oxygen saturation of 95.0%. The minimum oxygen saturation in NREM was 87.0 % and in REM was 87.0%. The patient spent 5.2 minutes of TST with SaO2 <88%.  Cardiac:  The highest heart rate seen while awake was 103 BPM while the highest heart rate during sleep was 88 BPM with an average sleeping heart rate of 67 BPM.  Limb Movements:  There were a total of 296 PLMs during sleep which resulted in a PLMS index of 42.8. Of these, 163 were associated with arousals which resulted in a PLMS arousal index of 23.6.  Titration:   CPAP was tried from 11 to 15. BiPAP was tried from 15/10 to 24/20  This was a fully attended sleep study. This test was technically adequate. This patient was titrated on CPAP starting at 11 cm of water pressure. Patient was titrated up to BiPAP 24/20 cm of water pressure.     Impression:  1.  Patient used CPAP and BiPAP during night of study  2.  Earlier in the night patient responded well to CPAP therapy however no supine REM sleep was seen on CPAP therapy  3.  As the night progressed patient was switched over to BiPAP as pressures increased  4.  Supine REM sleep was seen on BiPAP therapy and respiratory events were well-controlled  5.  Towards end of night patient did require elevated PAP pressures  6.  Oxygen saturations increased with BiPAP therapy compared to CPAP therapy    Recommendations:  I recommend consideration of auto BiPAP EPAP 11 IPAP 24 pressure support 4 cmH2O.  Patient used a medium  Scout mask during night of study.  Depending on patient's sensation being on CPAP versus BiPAP during night of study may help guide therapy.  There was improvement in respiratory events with side sleeping at a CPAP pressure of 11 cmH2O.  If patient felt more comfortable on CPAP may consider continuation of CPAP therapy.  Otherwise would recommend trying BiPAP therapy with elevated BiPAP pressures if patient feels comfortable.   I also recommend 30 day compliance download to assess the efficacy to the recommended pressure, measure leak, apnea hypopnea index and compliance for further outpatient monitoring and management of PAP therapy. In some cases alternative treatment options may be proven effective in resolving sleep apnea. These options include upper airway surgery, the use of a dental orthotic, weight loss, or positional therapy. Clinical correlation is required. In general patients with sleep apnea are advised to avoid alcohol, sedatives and not to operate a motor vehicle while drowsy.  Untreated sleep apnea increases the risk for cardiovascular and neurovascular disease.

## 2024-04-27 LAB
IGF-I SERPL-MCNC: 340 NG/ML (ref 78–233)
IGF-I Z-SCORE SERPL: 3.3

## 2024-05-08 ENCOUNTER — OFFICE VISIT (OUTPATIENT)
Dept: SLEEP MEDICINE | Facility: MEDICAL CENTER | Age: 44
End: 2024-05-08
Attending: NURSE PRACTITIONER
Payer: COMMERCIAL

## 2024-05-08 ENCOUNTER — HOSPITAL ENCOUNTER (OUTPATIENT)
Dept: LAB | Facility: MEDICAL CENTER | Age: 44
End: 2024-05-08
Attending: NURSE PRACTITIONER
Payer: COMMERCIAL

## 2024-05-08 ENCOUNTER — HOSPITAL ENCOUNTER (OUTPATIENT)
Dept: RADIOLOGY | Facility: MEDICAL CENTER | Age: 44
End: 2024-05-08
Attending: FAMILY MEDICINE
Payer: COMMERCIAL

## 2024-05-08 VITALS
HEIGHT: 69 IN | OXYGEN SATURATION: 99 % | DIASTOLIC BLOOD PRESSURE: 82 MMHG | WEIGHT: 153 LBS | RESPIRATION RATE: 16 BRPM | HEART RATE: 61 BPM | BODY MASS INDEX: 22.66 KG/M2 | SYSTOLIC BLOOD PRESSURE: 124 MMHG

## 2024-05-08 DIAGNOSIS — G89.29 CHRONIC BILATERAL LOW BACK PAIN WITH RIGHT-SIDED SCIATICA: ICD-10-CM

## 2024-05-08 DIAGNOSIS — Z78.9 NONSMOKER: ICD-10-CM

## 2024-05-08 DIAGNOSIS — G47.61 PLMD (PERIODIC LIMB MOVEMENT DISORDER): ICD-10-CM

## 2024-05-08 DIAGNOSIS — J34.89 OBSTRUCTION OF PARANASAL SINUS: ICD-10-CM

## 2024-05-08 DIAGNOSIS — G25.81 RLS (RESTLESS LEGS SYNDROME): ICD-10-CM

## 2024-05-08 DIAGNOSIS — G47.33 OBSTRUCTIVE SLEEP APNEA: Chronic | ICD-10-CM

## 2024-05-08 DIAGNOSIS — M54.41 CHRONIC BILATERAL LOW BACK PAIN WITH RIGHT-SIDED SCIATICA: ICD-10-CM

## 2024-05-08 LAB
FERRITIN SERPL-MCNC: 174 NG/ML (ref 22–322)
IRON SATN MFR SERPL: 59 % (ref 15–55)
IRON SERPL-MCNC: 158 UG/DL (ref 50–180)
MAGNESIUM SERPL-MCNC: 1.8 MG/DL (ref 1.5–2.5)
TIBC SERPL-MCNC: 268 UG/DL (ref 250–450)
UIBC SERPL-MCNC: 110 UG/DL (ref 110–370)

## 2024-05-08 PROCEDURE — 3079F DIAST BP 80-89 MM HG: CPT | Performed by: NURSE PRACTITIONER

## 2024-05-08 PROCEDURE — 99215 OFFICE O/P EST HI 40 MIN: CPT | Performed by: NURSE PRACTITIONER

## 2024-05-08 PROCEDURE — 3074F SYST BP LT 130 MM HG: CPT | Performed by: NURSE PRACTITIONER

## 2024-05-08 ASSESSMENT — FIBROSIS 4 INDEX: FIB4 SCORE: 1.03

## 2024-05-08 NOTE — PROGRESS NOTES
Chief Complaint   Patient presents with    Follow-Up     Sleep study 4/24/2024  Last seen on 2/8/2024- Molly Cash APRN  Cpap  11       HPI:  Mert Carl II is a 43 y.o. year old male here today for follow-up on sleep study results.  Last OV 2/8/24    He is accompanied by his wife.     Titration 4/24/24 sleep efficiency 86%, with titration on cpap/bipap and recommendation of autobipap 11/24, ps4 with medium Scout.  Reviewed details with patient.  Also discussed increased PLMS index noted on study.  He denies RLS symptoms.  He notes having a leg twitch all of his life while awake and at night but it does not bother him at night he does not feel it interferes with his sleep.  MSLT was not completed since he was titrated on CPAP and BiPAP further.  He continues to report daytime sleepiness and general fatigue.  He is seen by endocrinology for history of hyperprolactinemia.    Currently using CPAP @ 11cm H20 nightly; RESPIRONICS; device obtained 2021.   Report 4/8/2024 through 5/7/2024 indicates 96.7% compliance, average nightly use 5 hours 48 minutes, no significant mask leak with full face mask and reduced AHI of 2.0/h.  Reviewed in detail with patient.    Interval events:  5/8/2024: Patient was seen by ENT who notes a severe deviated septum and does recommend surgery in the next 2 to 3 months.  Patient would like to pursue this.  Inspire implant was also discussed by ENT and reviewed in detail with patient.  2/8/2024: Dry mouth and obtained chinstrap.  Waking 3-4 times at night due to dry mouth.  Consistent bedtime schedule going to bed at 10 PM, falling asleep quickly, waking 2-3 times at night due to dry mouth and resuming sleep till 5:30 AM for work.    SLEEP HX:  PSG split night 2/27/21 noted sleep efficiency of 50%, overall AHI 51.4/h and REM AHI 52.71/h with supine AHI 57.01/h.  39% events were central.  PLMS index of 23.1/h.  Mean SPO2 97% with O2 mikal of 89%.  Patient was titrated on CPAP and  "BiPAP with best tolerated pressure BiPAP 18/13 cm.  AHI improved to 3.53/h and O2 mikal of 95%.  Treatment emergent central apneas noted.  Overall incomplete titration study.      ROS: As per HPI and otherwise negative if not stated.    Past Medical History:   Diagnosis Date    Chickenpox        History reviewed. No pertinent surgical history.    Family History   Problem Relation Age of Onset    Sleep Apnea Neg Hx        Social History     Socioeconomic History    Marital status:      Spouse name: Not on file    Number of children: Not on file    Years of education: Not on file    Highest education level: Not on file   Occupational History    Not on file   Tobacco Use    Smoking status: Never    Smokeless tobacco: Never   Vaping Use    Vaping Use: Every day    Substances: Nicotine   Substance and Sexual Activity    Alcohol use: Yes     Comment: 2-3 a day     Drug use: Not Currently    Sexual activity: Not on file   Other Topics Concern    Not on file   Social History Narrative    Not on file     Social Determinants of Health     Financial Resource Strain: Not on file   Food Insecurity: Not on file   Transportation Needs: Not on file   Physical Activity: Not on file   Stress: Not on file   Social Connections: Not on file   Intimate Partner Violence: Not on file   Housing Stability: Not on file       Allergies as of 05/08/2024    (No Known Allergies)        Vitals:  /82 (BP Location: Left arm, Patient Position: Sitting, BP Cuff Size: Adult)   Pulse 61   Resp 16   Ht 1.753 m (5' 9\")   Wt 69.4 kg (153 lb)   SpO2 99%     Current medications as of today   Current Outpatient Medications   Medication Sig Dispense Refill    cabergoline (DOSTINEX) 0.5 MG tablet Take 1 Tablet by mouth every 7 days. 12 Tablet 2    omeprazole (PRILOSEC) 40 MG delayed-release capsule Take 1 capsule (40 mg) by mouth every morning before breakfast. 90 Capsule 3    amLODIPine (NORVASC) 2.5 MG Tab Take 1 Tablet by mouth every day. " 90 Tablet 2    MULTIPLE VITAMINS-MINERALS ER PO Take 50 mg by mouth every day.      escitalopram (LEXAPRO) 10 MG Tab Take 1 Tablet by mouth every day.      escitalopram (LEXAPRO) 10 MG Tab Take 1 tablet (10 mg) by mouth once daily for 90 days. 90 Tablet 2    bromocriptine (PARLODEL) 2.5 MG Tab Take 1 Tablet by mouth every day.       No current facility-administered medications for this visit.         Physical Exam:   Gen:           Alert and oriented, No apparent distress. Mood and affect appropriate, normal interaction with examiner.  Eyes:          PERRL, EOM intact, sclere white, conjunctive moist.  Ears:          Not examined.   Hearing:     Grossly intact.  Nose:          Normal, no lesions or deformities.  Dentition:    Not examined.   Oropharynx:   Not examined.   Mallampati Classification: Not examined.   Neck:        Supple, trachea midline, no masses.  Respiratory Effort: No intercostal retractions or use of accessory muscles.   Lung Auscultation:      Clear to auscultation bilaterally; no rales, rhonchi or wheezing.  CV:            Regular rate and rhythm. No murmurs, rubs or gallops.  Abd:           Not examined.   Lymphadenopathy: Not examined.  Gait and Station: Normal.  Digits and Nails: No clubbing, cyanosis, petechiae, or nodes.   Cranial Nerves: II-XII grossly intact.  Skin:        No rashes, lesions or ulcers noted.               Ext:           No cyanosis or edema.      Assessment:  1. Obstructive sleep apnea        2. Obstruction of paranasal sinus        3. BMI 22.0-22.9, adult        4. Nonsmoker        5. RLS (restless legs syndrome)  MAGNESIUM    IRON/TOTAL IRON BIND    FERRITIN      6. PLMD (periodic limb movement disorder)  MAGNESIUM    IRON/TOTAL IRON BIND    FERRITIN          Immunizations:    Flu:9/5/23  Pneumovax 23:not due  Prevnar 13:not due  PCV 20: not due  COVID-19: 2021    Plan:  Say appears improved on CPAP 11 cm but patient would like trial of auto BiPAP.  Patient will bring  his device to the office tomorrow to see if it is capable of this otherwise we would have to order a new device.  Follow-up with ENT for sinus surgery for severely deviated septum.  Once repaired recommend updating sleep testing since this could significantly improve his sleep apnea.  Follow-up with primary care and endocrinology as scheduled.  Lab work for workup of RLS/PLMD.  Follow-up in 3 months to review treatment plan, sooner if needed.  May send results and discuss further via [employer].  ADDENDUM 5/13/24: This patient CPAP device in office and is only capable of CPAP and auto CPAP mode.  Will go ahead and order auto BiPAP to attempt improvement in sleep apnea.  Lab work for RLS/PLMD workup was normal including magnesium and ferritin/iron levels.  He did have imaging for his spine with abnormalities and is starting physical therapy.    Please note that this dictation was created using voice recognition software. I have made every reasonable attempt to correct obvious errors, but it is possible there are errors of grammar and possibly content that I did not discover before finalizing the note.

## 2024-08-08 ENCOUNTER — APPOINTMENT (OUTPATIENT)
Dept: SLEEP MEDICINE | Facility: MEDICAL CENTER | Age: 44
End: 2024-08-08
Attending: NURSE PRACTITIONER
Payer: COMMERCIAL

## 2024-11-09 ENCOUNTER — HOSPITAL ENCOUNTER (OUTPATIENT)
Dept: LAB | Facility: MEDICAL CENTER | Age: 44
End: 2024-11-09
Attending: FAMILY MEDICINE
Payer: COMMERCIAL

## 2024-11-09 LAB
ALBUMIN SERPL BCP-MCNC: 4.1 G/DL (ref 3.2–4.9)
ALBUMIN/GLOB SERPL: 1.3 G/DL
ALP SERPL-CCNC: 73 U/L (ref 30–99)
ALT SERPL-CCNC: 33 U/L (ref 2–50)
ANION GAP SERPL CALC-SCNC: 12 MMOL/L (ref 7–16)
AST SERPL-CCNC: 30 U/L (ref 12–45)
BASOPHILS # BLD AUTO: 0.5 % (ref 0–1.8)
BASOPHILS # BLD: 0.03 K/UL (ref 0–0.12)
BILIRUB SERPL-MCNC: 0.4 MG/DL (ref 0.1–1.5)
BUN SERPL-MCNC: 14 MG/DL (ref 8–22)
CALCIUM ALBUM COR SERPL-MCNC: 9.9 MG/DL (ref 8.5–10.5)
CALCIUM SERPL-MCNC: 10 MG/DL (ref 8.5–10.5)
CHLORIDE SERPL-SCNC: 103 MMOL/L (ref 96–112)
CHOLEST SERPL-MCNC: 179 MG/DL (ref 100–199)
CO2 SERPL-SCNC: 26 MMOL/L (ref 20–33)
CREAT SERPL-MCNC: 0.67 MG/DL (ref 0.5–1.4)
EOSINOPHIL # BLD AUTO: 0.1 K/UL (ref 0–0.51)
EOSINOPHIL NFR BLD: 1.6 % (ref 0–6.9)
ERYTHROCYTE [DISTWIDTH] IN BLOOD BY AUTOMATED COUNT: 44.6 FL (ref 35.9–50)
EST. AVERAGE GLUCOSE BLD GHB EST-MCNC: 111 MG/DL
GFR SERPLBLD CREATININE-BSD FMLA CKD-EPI: 118 ML/MIN/1.73 M 2
GLOBULIN SER CALC-MCNC: 3.2 G/DL (ref 1.9–3.5)
GLUCOSE SERPL-MCNC: 100 MG/DL (ref 65–99)
HBA1C MFR BLD: 5.5 % (ref 4–5.6)
HCT VFR BLD AUTO: 40.9 % (ref 42–52)
HDLC SERPL-MCNC: 75 MG/DL
HGB BLD-MCNC: 13.3 G/DL (ref 14–18)
IMM GRANULOCYTES # BLD AUTO: 0.01 K/UL (ref 0–0.11)
IMM GRANULOCYTES NFR BLD AUTO: 0.2 % (ref 0–0.9)
IRON SATN MFR SERPL: 23 % (ref 15–55)
IRON SERPL-MCNC: 70 UG/DL (ref 50–180)
LDLC SERPL CALC-MCNC: 93 MG/DL
LYMPHOCYTES # BLD AUTO: 1.45 K/UL (ref 1–4.8)
LYMPHOCYTES NFR BLD: 22.6 % (ref 22–41)
MCH RBC QN AUTO: 30.4 PG (ref 27–33)
MCHC RBC AUTO-ENTMCNC: 32.5 G/DL (ref 32.3–36.5)
MCV RBC AUTO: 93.4 FL (ref 81.4–97.8)
MONOCYTES # BLD AUTO: 0.88 K/UL (ref 0–0.85)
MONOCYTES NFR BLD AUTO: 13.7 % (ref 0–13.4)
NEUTROPHILS # BLD AUTO: 3.95 K/UL (ref 1.82–7.42)
NEUTROPHILS NFR BLD: 61.4 % (ref 44–72)
NRBC # BLD AUTO: 0 K/UL
NRBC BLD-RTO: 0 /100 WBC (ref 0–0.2)
PLATELET # BLD AUTO: 224 K/UL (ref 164–446)
PMV BLD AUTO: 10.2 FL (ref 9–12.9)
POTASSIUM SERPL-SCNC: 4.4 MMOL/L (ref 3.6–5.5)
PROT SERPL-MCNC: 7.3 G/DL (ref 6–8.2)
RBC # BLD AUTO: 4.38 M/UL (ref 4.7–6.1)
SODIUM SERPL-SCNC: 141 MMOL/L (ref 135–145)
T3FREE SERPL-MCNC: 2.97 PG/ML (ref 2–4.4)
T4 FREE SERPL-MCNC: 0.73 NG/DL (ref 0.93–1.7)
TIBC SERPL-MCNC: 299 UG/DL (ref 250–450)
TRIGL SERPL-MCNC: 53 MG/DL (ref 0–149)
TSH SERPL-ACNC: 0.53 UIU/ML (ref 0.35–5.5)
UIBC SERPL-MCNC: 229 UG/DL (ref 110–370)
WBC # BLD AUTO: 6.4 K/UL (ref 4.8–10.8)

## 2024-11-09 PROCEDURE — 80053 COMPREHEN METABOLIC PANEL: CPT

## 2024-11-09 PROCEDURE — 84443 ASSAY THYROID STIM HORMONE: CPT

## 2024-11-09 PROCEDURE — 84439 ASSAY OF FREE THYROXINE: CPT

## 2024-11-09 PROCEDURE — 83540 ASSAY OF IRON: CPT

## 2024-11-09 PROCEDURE — 80061 LIPID PANEL: CPT

## 2024-11-09 PROCEDURE — 83036 HEMOGLOBIN GLYCOSYLATED A1C: CPT

## 2024-11-09 PROCEDURE — 36415 COLL VENOUS BLD VENIPUNCTURE: CPT

## 2024-11-09 PROCEDURE — 85025 COMPLETE CBC W/AUTO DIFF WBC: CPT

## 2024-11-09 PROCEDURE — 83550 IRON BINDING TEST: CPT

## 2024-11-09 PROCEDURE — 84481 FREE ASSAY (FT-3): CPT

## 2024-12-12 ENCOUNTER — OFFICE VISIT (OUTPATIENT)
Dept: SLEEP MEDICINE | Facility: MEDICAL CENTER | Age: 44
End: 2024-12-12
Attending: NURSE PRACTITIONER
Payer: COMMERCIAL

## 2024-12-12 VITALS
OXYGEN SATURATION: 97 % | HEART RATE: 75 BPM | SYSTOLIC BLOOD PRESSURE: 132 MMHG | WEIGHT: 160 LBS | HEIGHT: 69 IN | DIASTOLIC BLOOD PRESSURE: 84 MMHG | BODY MASS INDEX: 23.7 KG/M2 | RESPIRATION RATE: 16 BRPM

## 2024-12-12 DIAGNOSIS — J34.89 OBSTRUCTION OF PARANASAL SINUS: ICD-10-CM

## 2024-12-12 DIAGNOSIS — Z78.9 NONSMOKER: ICD-10-CM

## 2024-12-12 DIAGNOSIS — G47.33 OBSTRUCTIVE SLEEP APNEA: Chronic | ICD-10-CM

## 2024-12-12 PROCEDURE — 3075F SYST BP GE 130 - 139MM HG: CPT | Performed by: NURSE PRACTITIONER

## 2024-12-12 PROCEDURE — 99214 OFFICE O/P EST MOD 30 MIN: CPT | Performed by: NURSE PRACTITIONER

## 2024-12-12 PROCEDURE — 99213 OFFICE O/P EST LOW 20 MIN: CPT | Performed by: NURSE PRACTITIONER

## 2024-12-12 PROCEDURE — 3079F DIAST BP 80-89 MM HG: CPT | Performed by: NURSE PRACTITIONER

## 2024-12-12 ASSESSMENT — FIBROSIS 4 INDEX: FIB4 SCORE: 1.03

## 2024-12-12 NOTE — PROGRESS NOTES
Chief Complaint   Patient presents with    Follow-Up     Last Office Visit 5/8/2024 with Molly SORENSON     1st Compliance: Yes    DME: [facility]     PAP/O2/OAT: auto bipap ipap 24 epap 11 ps 4       HPI:  Mert Carl II is a 44 y.o. year old male here today for follow-up on JUAN MANUEL; comp check on new BIPAP.  Last OV 5/8/24     Titration 4/24/24 sleep efficiency 86%, with titration on cpap/bipap and recommendation of autobipap 11/24, ps4 with medium Scout.  BIPAP ordered.    Currently using AutoBIPAP @ 24/11cm, PS4cm H20 nightly; RESMED; device obtained 7/2024.  Compliance report 12/24 through 12/11/2024 indicates 26 nights of use, 21 nights greater than 4 hours use, average nightly is 5 hours 55 minutes, low to moderate mask leak with full facemask with reduced AHI of 1.8/h.  Reviewed with patient.    Interval events:  12/12/24: Wife accompanies him. RLS/PLMD labs normal. Ultimately could still benefit from sinus surgery. Tolerating BIPAP better. Need to work on consistency.  [place] slept an average 7 hours per night but was still wake up feeling exhausted.  His wife noticed color changes in his face and he would take a nap and wake up and he would be more pink again.  Review of lab work from this November notes anemia with low RBCs and H&H.  His T4 is also low.  Recommend follow-up with endocrinology and possibly hematology to address.  His overall this device is more comfortable.  He is not having significant sinus issues with it.    5/8/2024: Patient was seen by ENT who notes a severe deviated septum and does recommend surgery in the next 2 to 3 months.  Patient would like to pursue this.  Inspire implant was also discussed by ENT and reviewed in detail with patient.  2/8/2024: Dry mouth and obtained chinstrap.  Waking 3-4 times at night due to dry mouth.  Consistent bedtime schedule going to bed at 10 PM, falling asleep quickly, waking 2-3 times at night due to  "dry mouth and resuming sleep till 5:30 AM for work.     SLEEP HX:  PSG split night 2/27/21 noted sleep efficiency of 50%, overall AHI 51.4/h and REM AHI 52.71/h with supine AHI 57.01/h.  39% events were central.  PLMS index of 23.1/h.  Mean SPO2 97% with O2 mikal of 89%.  Patient was titrated on CPAP and BiPAP with best tolerated pressure BiPAP 18/13 cm.  AHI improved to 3.53/h and O2 mikal of 95%.  Treatment emergent central apneas noted.  Overall incomplete titration study.      ROS: As per HPI and otherwise negative if not stated.    Past Medical History:   Diagnosis Date    Chickenpox        History reviewed. No pertinent surgical history.    Family History   Problem Relation Age of Onset    Sleep Apnea Neg Hx        Social History     Socioeconomic History    Marital status:      Spouse name: Not on file    Number of children: Not on file    Years of education: Not on file    Highest education level: Not on file   Occupational History    Not on file   Tobacco Use    Smoking status: Never    Smokeless tobacco: Never   Vaping Use    Vaping status: Every Day    Substances: Nicotine   Substance and Sexual Activity    Alcohol use: Yes     Comment: 2-3 a day     Drug use: Not Currently    Sexual activity: Not on file   Other Topics Concern    Not on file   Social History Narrative    Not on file     Social Drivers of Health     Financial Resource Strain: Not on file   Food Insecurity: Not on file   Transportation Needs: Not on file   Physical Activity: Not on file   Stress: Not on file   Social Connections: Not on file   Intimate Partner Violence: Not on file   Housing Stability: Not on file       Allergies as of 12/12/2024    (No Known Allergies)        Vitals:  /84 (BP Location: Left arm, Patient Position: Sitting, BP Cuff Size: Adult)   Pulse 75   Resp 16   Ht 1.753 m (5' 9\")   Wt 72.6 kg (160 lb)   SpO2 97%     Current medications as of today   Current Outpatient Medications   Medication Sig " Dispense Refill    methylPREDNISolone (MEDROL DOSEPAK) 4 MG Tablet Therapy Pack Follow schedule on package instructions. 21 Tablet 0    meloxicam (MOBIC) 15 MG tablet Take 1 Tablet by mouth every day. 30 Tablet 0    cabergoline (DOSTINEX) 0.5 MG tablet Take 1 Tablet by mouth every 7 days. 12 Tablet 2    omeprazole (PRILOSEC) 40 MG delayed-release capsule Take 1 capsule (40 mg) by mouth every morning before breakfast. 90 Capsule 3    amLODIPine (NORVASC) 2.5 MG Tab Take 1 Tablet by mouth every day. 90 Tablet 2    MULTIPLE VITAMINS-MINERALS ER PO Take 50 mg by mouth every day.      bromocriptine (PARLODEL) 2.5 MG Tab Take 1 Tablet by mouth every day.      escitalopram (LEXAPRO) 10 MG Tab Take 1 Tablet by mouth every day.       No current facility-administered medications for this visit.         Physical Exam:   Gen:           Alert and oriented, No apparent distress. Mood and affect appropriate, normal interaction with examiner.  Eyes:          PERRL, EOM intact, sclere white, conjunctive moist.  Ears:          Not examined.   Hearing:     Grossly intact.  Nose:          Normal, no lesions or deformities.  Dentition:    Not examined.   Oropharynx:   Not examined.   Mallampati Classification: Not examined.   Neck:        Supple, trachea midline, no masses.  Respiratory Effort: No intercostal retractions or use of accessory muscles.   Lung Auscultation:      Clear to auscultation bilaterally; no rales, rhonchi or wheezing.  CV:            Regular rate and rhythm. No murmurs, rubs or gallops.  Abd:           Not examined.   Lymphadenopathy: Not examined.  Gait and Station: Normal.  Digits and Nails: No clubbing, cyanosis, petechiae, or nodes.   Cranial Nerves: II-XII grossly intact.  Skin:        No rashes, lesions or ulcers noted.               Ext:           No cyanosis or edema. RLE in boot      Assessment:  1. Obstructive sleep apnea        2. Obstruction of paranasal sinus        3. BMI 23.0-23.9, adult        4.  Nonsmoker            Immunizations:    Flu:11/2024  Pneumovax 23:not due  Prevnar 13:not due  PCV 20: not due  COVID-19: 5/2021    Plan:  JUAN MANUEL is improved on BiPAP.  Will continue to monitor therapy.  Recommend ongoing consistent use and reviewed sleep hygiene.  Since returning from his trip and not working due to his injury his sleep schedule is off.  We reviewed how important it is to maintain consistency.  His lab work noting anemia and possible thyroid dysfunction is also contributing to this.  Recommend follow-up with hematology and endocrinology.   DME mask/supplies  Follow-up with ENT in the future for possible sinus surgery.  We will hold off at this point since he is tolerating BiPAP well.  Encourage healthy diet and tolerated activity with his right lower extremity inner injured for conditioning.  Follow-up in 3 months for compliance check, sooner if needed.    Please note that this dictation was created using voice recognition software. I have made every reasonable attempt to correct obvious errors, but it is possible there are errors of grammar and possibly content that I did not discover before finalizing the note.

## 2024-12-17 ENCOUNTER — TELEPHONE (OUTPATIENT)
Dept: ENDOCRINOLOGY | Facility: MEDICAL CENTER | Age: 44
End: 2024-12-17
Payer: COMMERCIAL

## 2024-12-18 DIAGNOSIS — R40.0 DAYTIME SOMNOLENCE: ICD-10-CM

## 2024-12-18 DIAGNOSIS — D64.9 ANEMIA, UNSPECIFIED TYPE: ICD-10-CM

## 2024-12-18 DIAGNOSIS — R79.89 ABNORMAL SERUM THYROXINE (T4) LEVEL: ICD-10-CM

## 2024-12-18 NOTE — TELEPHONE ENCOUNTER
Pt called to get an appt scheduled I had to put him on hold to get some information before I moved forward with the conversation.    Found out he needed a referral before we can schedule him but we got disconnected before I could let him know.    Luckily he called back and another PAR was able to relay my message to him and take care of anything else he may have needed

## 2025-01-30 ENCOUNTER — TELEPHONE (OUTPATIENT)
Dept: ENDOCRINOLOGY | Facility: MEDICAL CENTER | Age: 45
End: 2025-01-30
Payer: COMMERCIAL

## 2025-01-30 DIAGNOSIS — E55.9 VITAMIN D DEFICIENCY: ICD-10-CM

## 2025-01-30 DIAGNOSIS — R79.89 ELEVATED GROWTH HORMONE (GH) LEVEL: ICD-10-CM

## 2025-01-30 DIAGNOSIS — R79.89 ABNORMAL THYROID BLOOD TEST: ICD-10-CM

## 2025-01-30 DIAGNOSIS — E22.1 HYPERPROLACTINEMIA (HCC): ICD-10-CM

## 2025-01-30 NOTE — TELEPHONE ENCOUNTER
Pt's wife left  vm wanting to see if her  had any pending labs before next appt with Dr. Dubose. Pt is n2u pt for  from [facility]. Can you please order labs if needed

## 2025-02-14 ENCOUNTER — HOSPITAL ENCOUNTER (OUTPATIENT)
Dept: LAB | Facility: MEDICAL CENTER | Age: 45
End: 2025-02-14
Attending: INTERNAL MEDICINE
Payer: COMMERCIAL

## 2025-02-14 DIAGNOSIS — E55.9 VITAMIN D DEFICIENCY: ICD-10-CM

## 2025-02-14 DIAGNOSIS — R79.89 ABNORMAL THYROID BLOOD TEST: ICD-10-CM

## 2025-02-14 DIAGNOSIS — E22.1 HYPERPROLACTINEMIA (HCC): ICD-10-CM

## 2025-02-14 LAB
25(OH)D3 SERPL-MCNC: 49 NG/ML (ref 30–100)
ALBUMIN SERPL BCP-MCNC: 4.4 G/DL (ref 3.2–4.9)
ALBUMIN/GLOB SERPL: 1.3 G/DL
ALP SERPL-CCNC: 82 U/L (ref 30–99)
ALT SERPL-CCNC: 37 U/L (ref 2–50)
ANION GAP SERPL CALC-SCNC: 14 MMOL/L (ref 7–16)
AST SERPL-CCNC: 42 U/L (ref 12–45)
BILIRUB SERPL-MCNC: 0.6 MG/DL (ref 0.1–1.5)
BUN SERPL-MCNC: 12 MG/DL (ref 8–22)
CALCIUM ALBUM COR SERPL-MCNC: 9.2 MG/DL (ref 8.5–10.5)
CALCIUM SERPL-MCNC: 9.5 MG/DL (ref 8.5–10.5)
CHLORIDE SERPL-SCNC: 104 MMOL/L (ref 96–112)
CO2 SERPL-SCNC: 23 MMOL/L (ref 20–33)
CREAT SERPL-MCNC: 0.83 MG/DL (ref 0.5–1.4)
GFR SERPLBLD CREATININE-BSD FMLA CKD-EPI: 111 ML/MIN/1.73 M 2
GLOBULIN SER CALC-MCNC: 3.3 G/DL (ref 1.9–3.5)
GLUCOSE SERPL-MCNC: 84 MG/DL (ref 65–99)
POTASSIUM SERPL-SCNC: 3.7 MMOL/L (ref 3.6–5.5)
PROLACTIN SERPL-MCNC: 15.6 NG/ML (ref 2.1–17.7)
PROT SERPL-MCNC: 7.7 G/DL (ref 6–8.2)
SODIUM SERPL-SCNC: 141 MMOL/L (ref 135–145)
T3FREE SERPL-MCNC: 3.72 PG/ML (ref 2–4.4)
T4 FREE SERPL-MCNC: 1.09 NG/DL (ref 0.93–1.7)
TSH SERPL-ACNC: 0.81 UIU/ML (ref 0.35–5.5)

## 2025-02-14 PROCEDURE — 84146 ASSAY OF PROLACTIN: CPT

## 2025-02-14 PROCEDURE — 84439 ASSAY OF FREE THYROXINE: CPT

## 2025-02-14 PROCEDURE — 84481 FREE ASSAY (FT-3): CPT

## 2025-02-14 PROCEDURE — 36415 COLL VENOUS BLD VENIPUNCTURE: CPT

## 2025-02-14 PROCEDURE — 80053 COMPREHEN METABOLIC PANEL: CPT

## 2025-02-14 PROCEDURE — 82306 VITAMIN D 25 HYDROXY: CPT

## 2025-02-14 PROCEDURE — 84443 ASSAY THYROID STIM HORMONE: CPT

## 2025-02-28 ENCOUNTER — OFFICE VISIT (OUTPATIENT)
Dept: ENDOCRINOLOGY | Facility: MEDICAL CENTER | Age: 45
End: 2025-02-28
Attending: INTERNAL MEDICINE
Payer: COMMERCIAL

## 2025-02-28 VITALS
HEART RATE: 67 BPM | SYSTOLIC BLOOD PRESSURE: 156 MMHG | BODY MASS INDEX: 23.25 KG/M2 | WEIGHT: 157 LBS | DIASTOLIC BLOOD PRESSURE: 88 MMHG | HEIGHT: 69 IN | OXYGEN SATURATION: 97 %

## 2025-02-28 DIAGNOSIS — R79.89 ELEVATED GROWTH HORMONE (GH) LEVEL: ICD-10-CM

## 2025-02-28 DIAGNOSIS — R79.89 ABNORMAL THYROID BLOOD TEST: ICD-10-CM

## 2025-02-28 DIAGNOSIS — R79.89 ELEVATED PROLACTIN LEVEL: ICD-10-CM

## 2025-02-28 DIAGNOSIS — R03.0 ELEVATED BLOOD PRESSURE READING: ICD-10-CM

## 2025-02-28 DIAGNOSIS — D35.2 PITUITARY ADENOMA (HCC): ICD-10-CM

## 2025-02-28 DIAGNOSIS — R53.83 OTHER FATIGUE: ICD-10-CM

## 2025-02-28 PROCEDURE — 99211 OFF/OP EST MAY X REQ PHY/QHP: CPT | Performed by: INTERNAL MEDICINE

## 2025-02-28 ASSESSMENT — FIBROSIS 4 INDEX: FIB4 SCORE: 1.36

## 2025-02-28 NOTE — PROGRESS NOTES
Chief Complaint: Follow up for mild hyperprolactinemia, pituitary adenoma, elevated IGF-I, abnormal thyroid function test, chronic fatigue    HPI:     Mert Carl II is a 44 y.o. male here for follow up of the above medical issues.    He was previously followed by 2 nurse practitioners, Sharda Robert, and then Juan Alberto Robertson    He was found to have a elevated prolactin of 22 back in 2021 while he was being worked up for infertility by Dr. Gautam.      He was treated with cabergoline by the nurse practitioner send prolactin levels became controlled      Interestingly he has been off the medication for 6 months now and it turns out his prolactin is still normal at 15 on February 2025    He denies gynecomastia         As part of his hyperprolactinemia workup he had a pituitary MRI in 2021 showing a 3 to 4 mm pituitary microadenoma    Interestingly his MRI has not been repeated          Also  as part of his workup for his elevated prolactin and IGF-I levels were drawn and they were initially normal but then they were found to be elevated on retesting    We suspect false positive elevation because he denies symptoms of acromegaly such as increase in hand or foot size   Latest Reference Range & Units 05/10/23 07:09 03/02/24 08:10 04/25/24 14:52   IGF1 78 - 233 ng/mL 194 247 (H) 340 (H)       He also had previously abnormal thyroid function test with isolated hypothyroxinemia but with normal TSH levels and free T4 levels which has been fluctuating on serial testing but the most recent thyroid labs have been within normal limits showing normal TSH free T4 and free T3 levels       Latest Reference Range & Units 10/07/22 13:29 10/26/22 16:22 02/10/23 08:21 05/10/23 07:09 03/02/24 08:10 04/25/24 14:52 11/09/24 10:35 02/14/25 09:49   TSH 0.350 - 5.500 uIU/mL 1.880 1.030 3.690 1.580 1.520 0.640 0.525 0.808   Free T-4 0.93 - 1.70 ng/dL 0.77 (L)  1.05 0.90 (L) 0.79 (L) 0.80 (L) 0.73 (L) 1.09   T3,Free 2.00 - 4.40 pg/mL    "    2.97 3.72         His main complaint currently is that he still has persistent fatigue.  He him and his wife were able to successfully conceived 2 years after the diagnosis of hyperprolactinemia.  He denies decreased libido.  He denies decrease in facial hair.  When he ejaculates there is copious amount of ejaculate.          Patient's medications, allergies, and social histories were reviewed and updated as appropriate.      ROS:     CONS:     No fever, no chills   EYES:     No diplopia, no blurry vision   CV:           No chest pain, no palpitations   PULM:     No SOB, no cough, no hemoptysis.   GI:            No nausea, no vomiting, no diarrhea, no constipation   ENDO:     No polyuria, no polydipsia, no heat intolerance, no cold intolerance       Past Medical History:  Problem List:  2024-02: Obstruction of paranasal sinus  2024-02: Blunt trauma of nose  2024-02: Daytime somnolence  2024-02: BMI 24.0-24.9, adult  2023-01: Obstructive sleep apnea  2022-04: Depression  2021-11: Pituitary adenoma ([facility])  2021-09: Anxiety  2021-09: Hyperprolactinemia ([facility])  2021-09: Hypogonadism  2021-08: Absence of testicle in scrotum  2021-08: Male infertility      Past Surgical History:  No past surgical history on file.     Allergies:  Patient has no known allergies.     Social History:  Social History     Tobacco Use    Smoking status: Former     Types: Cigarettes    Smokeless tobacco: Never    Tobacco comments:     Stopped smoking 12+ years ago. Vape only   Vaping Use    Vaping status: Every Day    Substances: Nicotine   Substance Use Topics    Alcohol use: Yes     Comment: 2-3 a day     Drug use: Not Currently        Family History:   family history is not on file.      PHYSICAL EXAM:   Vital signs: BP (!) 156/88   Pulse 67   Ht 1.753 m (5' 9\")   Wt 71.2 kg (157 lb)   SpO2 97%   BMI 23.18 kg/m²   GENERAL: Well-developed, well-nourished in no apparent distress.   EYE:  No ocular asymmetry, PERRLA  HENT: Pink, moist " "mucous membranes.    NECK: No thyromegaly.   CARDIOVASCULAR:  No murmurs  LUNGS: Clear breath sounds  ABDOMEN: Soft, nontender   EXTREMITIES: No clubbing, cyanosis, or edema.   NEUROLOGICAL: No gross focal motor abnormalities   LYMPH: No cervical adenopathy palpated.   SKIN: No rashes, lesions.       Labs:  Lab Results   Component Value Date/Time    SODIUM 141 02/14/2025 09:49 AM    POTASSIUM 3.7 02/14/2025 09:49 AM    CHLORIDE 104 02/14/2025 09:49 AM    CO2 23 02/14/2025 09:49 AM    ANION 14.0 02/14/2025 09:49 AM    GLUCOSE 84 02/14/2025 09:49 AM    BUN 12 02/14/2025 09:49 AM    CREATININE 0.83 02/14/2025 09:49 AM    CALCIUM 9.5 02/14/2025 09:49 AM    ASTSGOT 42 02/14/2025 09:49 AM    ALTSGPT 37 02/14/2025 09:49 AM    TBILIRUBIN 0.6 02/14/2025 09:49 AM    ALBUMIN 4.4 02/14/2025 09:49 AM    TOTPROTEIN 7.7 02/14/2025 09:49 AM    GLOBULIN 3.3 02/14/2025 09:49 AM    AGRATIO 1.3 02/14/2025 09:49 AM       Lab Results   Component Value Date/Time    SODIUM 141 02/14/2025 0949    POTASSIUM 3.7 02/14/2025 0949    CHLORIDE 104 02/14/2025 0949    CO2 23 02/14/2025 0949    GLUCOSE 84 02/14/2025 0949    BUN 12 02/14/2025 0949    CREATININE 0.83 02/14/2025 0949    CALCIUM 9.5 02/14/2025 0949    ANION 14.0 02/14/2025 0949       Lab Results   Component Value Date/Time    CHOLSTRLTOT 179 11/09/2024 1035    TRIGLYCERIDE 53 11/09/2024 1035    HDL 75 11/09/2024 1035    LDL 93 11/09/2024 1035       Lab Results   Component Value Date/Time    TSHULTRASEN 0.808 02/14/2025 0949     Lab Results   Component Value Date/Time    FREET4 1.09 02/14/2025 0949     Lab Results   Component Value Date/Time    FREET3 3.72 02/14/2025 0949     No results found for: \"THYSTIMIG\"    No results found for: \"MICROSOMALA\"      Imaging:      ASSESSMENT/PLAN:     1. Elevated prolactin level  Resolved prolactin levels are back to normal yes been off dopamine agonist for 6 months and he does not have recurrence of hyperprolactinemia    Baseline prolactin levels were " not significantly high to begin with    I do not believe that he has prolactinoma to begin with    No additional testing is needed at this time    2. Elevated growth hormone (GH) level  Unstable he has elevated IGF-I levels but no clinical symptoms of acromegaly suspect false positive test recommend repeating IGF-I with other labs such as [facility] or [facility] and I will update him    3. Abnormal thyroid blood test  Resolved he had isolated hypothyroxinemia but TSH was normal free T3 was normal no additional thyroid hormone testing is needed at this time    His pituitary microadenoma should not cause secondary hypothyroidism    4. Pituitary adenoma (HCC)  Unstable he has a pituitary microadenoma that has not been reimaged I am scheduling him for a pituitary MRI if it is stable then it does not need for additional imaging    5. Elevated blood pressure reading  Unstable  He has a history of elevated blood pressure but has not been taking his medications recommend checking blood pressure at home and if it is high he should restart blood pressure medication and contact primary care    6. Other fatigue  Unstable check a CBC iron levels per patient's wife request and also recheck his morning LH FSH and testosterone levels      Return if symptoms worsen or fail to improve.    Total time spent on day of service was over 60 minutes which included obtaining a detailed history and physical exam, ordering labs, coordinating care and scheduling future follow-up    Thank you kindly for allowing me to participate in the thyroid care plan for this patient.    Edgar Dubose MD, FACE, [facility], [facility]  02/28/25    CC:   Caio Pérez M.D.

## 2025-05-13 ENCOUNTER — APPOINTMENT (OUTPATIENT)
Dept: SLEEP MEDICINE | Facility: MEDICAL CENTER | Age: 45
End: 2025-05-13
Attending: NURSE PRACTITIONER
Payer: COMMERCIAL

## 2025-05-13 DIAGNOSIS — R06.2 WHEEZING: ICD-10-CM

## 2025-05-13 DIAGNOSIS — R06.02 SHORTNESS OF BREATH: ICD-10-CM

## 2025-05-14 LAB
BASOPHILS # BLD AUTO: 0.1 X10E3/UL (ref 0–0.2)
BASOPHILS NFR BLD AUTO: 1 %
EOSINOPHIL # BLD AUTO: 0 X10E3/UL (ref 0–0.4)
EOSINOPHIL NFR BLD AUTO: 1 %
ERYTHROCYTE [DISTWIDTH] IN BLOOD BY AUTOMATED COUNT: 12.6 % (ref 11.6–15.4)
FERRITIN SERPL-MCNC: 292 NG/ML (ref 30–400)
FSH SERPL-ACNC: 15.3 MIU/ML (ref 1.5–12.4)
HCT VFR BLD AUTO: 43.1 % (ref 37.5–51)
HGB BLD-MCNC: 14.3 G/DL (ref 13–17.7)
IGF-I SERPL-MCNC: 165 NG/ML (ref 84–270)
IMM GRANULOCYTES # BLD AUTO: 0 X10E3/UL (ref 0–0.1)
IMM GRANULOCYTES NFR BLD AUTO: 0 %
IMMATURE CELLS  115398: NORMAL
IRON SATN MFR SERPL: 30 % (ref 15–55)
IRON SERPL-MCNC: 83 UG/DL (ref 38–169)
LH SERPL-ACNC: 11.5 MIU/ML (ref 1.7–8.6)
LYMPHOCYTES # BLD AUTO: 1.2 X10E3/UL (ref 0.7–3.1)
LYMPHOCYTES NFR BLD AUTO: 22 %
MCH RBC QN AUTO: 30.4 PG (ref 26.6–33)
MCHC RBC AUTO-ENTMCNC: 33.2 G/DL (ref 31.5–35.7)
MCV RBC AUTO: 92 FL (ref 79–97)
MONOCYTES # BLD AUTO: 0.6 X10E3/UL (ref 0.1–0.9)
MONOCYTES NFR BLD AUTO: 11 %
MORPHOLOGY BLD-IMP: NORMAL
NEUTROPHILS # BLD AUTO: 3.7 X10E3/UL (ref 1.4–7)
NEUTROPHILS NFR BLD AUTO: 65 %
NRBC BLD AUTO-RTO: NORMAL %
PLATELET # BLD AUTO: 275 X10E3/UL (ref 150–450)
RBC # BLD AUTO: 4.71 X10E6/UL (ref 4.14–5.8)
SHBG SERPL-SCNC: 38.1 NMOL/L
TESTOST FREE MFR SERPL: 1.7 %
TESTOST FREE SERPL-MCNC: 58 PG/ML
TESTOST SERPL-MCNC: 344 NG/DL
TESTOST SERPL-MCNC: 379 NG/DL (ref 264–916)
TIBC SERPL-MCNC: 280 UG/DL (ref 250–450)
UIBC SERPL-MCNC: 197 UG/DL (ref 111–343)
WBC # BLD AUTO: 5.7 X10E3/UL (ref 3.4–10.8)

## 2025-05-15 ENCOUNTER — RESULTS FOLLOW-UP (OUTPATIENT)
Dept: ENDOCRINOLOGY | Facility: MEDICAL CENTER | Age: 45
End: 2025-05-15

## 2025-05-19 NOTE — Clinical Note
REFERRAL APPROVAL NOTICE         Sent on May 19, 2025                   Mahin Carl II  [address] [place] [address]                   Dear Mr. Carl,    After a careful review of the medical information and benefit coverage, [facility] has processed your referral. See below for additional details.    If applicable, you must be actively enrolled with your insurance for coverage of the authorized service. If you have any questions regarding your coverage, please contact your insurance directly.    REFERRAL INFORMATION   Referral #:  [id number]  Referred-To Department    Referred-By Provider:  Pulmonary and Sleep Medicine    JUNA ANTONIO Elizabeth   Pulmonary/sleep [facility]      [address]  [address]  [place] [address]  [phone] [address]  [place] [address] [phone]    Referral Start Date:  05/13/2025  Referral End Date:   05/13/2026           SCHEDULING  If you do not already have an appointment, please call [phone] to make an appointment.   MORE INFORMATION  As a reminder, [facility] ownership has changed, meaning this location is now owned and operated by [facility]. As such, we want to clarify that our patients should expect to receive two separate bills for the services received at [facility] - one representing the [facility] facility fees as the owner of the establishment, and the other to represent the physician's services and subsequent fees. You can speak with your insurance carrier for a pricing estimate by calling the customer service number on the back of your card and ask about charges for a hospital outpatient visit.  If you do not already have a [employer] account, sign up at: [email or web address]  You can access your medical information, make appointments, see lab results, billing  information, and more.  If you have questions regarding this referral, please contact  the [facility] department at:             [phone]. Monday - Friday 7:30AM - 5:00PM.      Sincerely,  [facility]

## 2025-06-13 ENCOUNTER — NON-PROVIDER VISIT (OUTPATIENT)
Dept: SLEEP MEDICINE | Facility: MEDICAL CENTER | Age: 45
End: 2025-06-13
Attending: NURSE PRACTITIONER
Payer: COMMERCIAL

## 2025-06-13 DIAGNOSIS — R06.02 SHORTNESS OF BREATH: ICD-10-CM

## 2025-06-13 DIAGNOSIS — R06.2 WHEEZING: ICD-10-CM

## 2025-06-13 PROCEDURE — 94729 DIFFUSING CAPACITY: CPT | Performed by: NURSE PRACTITIONER

## 2025-06-13 PROCEDURE — 94060 EVALUATION OF WHEEZING: CPT | Performed by: NURSE PRACTITIONER

## 2025-06-13 PROCEDURE — 94726 PLETHYSMOGRAPHY LUNG VOLUMES: CPT | Performed by: NURSE PRACTITIONER

## 2025-06-13 PROCEDURE — 94729 DIFFUSING CAPACITY: CPT | Mod: 26 | Performed by: STUDENT IN AN ORGANIZED HEALTH CARE EDUCATION/TRAINING PROGRAM

## 2025-06-13 PROCEDURE — 94726 PLETHYSMOGRAPHY LUNG VOLUMES: CPT | Mod: 26 | Performed by: STUDENT IN AN ORGANIZED HEALTH CARE EDUCATION/TRAINING PROGRAM

## 2025-06-13 PROCEDURE — 94060 EVALUATION OF WHEEZING: CPT | Mod: 26 | Performed by: STUDENT IN AN ORGANIZED HEALTH CARE EDUCATION/TRAINING PROGRAM

## 2025-06-13 NOTE — PROCEDURES
Tech RRT Poncho Nunez notes; Good patient effort and cooperation.  The results of this test meet the ATS/ERS standards for acceptability.  Two puffs of albuterol were given via spacer.  DLCO performed during dilation    Interpretation;  Normal spirometry without significant bronchodilator response.   Normal lung volumes without significant air trapping  Normal DLCO  Normal MVV   Flow volume loop within normal limits

## 2025-07-12 ENCOUNTER — OFFICE VISIT (OUTPATIENT)
Dept: URGENT CARE | Facility: CLINIC | Age: 45
End: 2025-07-12
Payer: COMMERCIAL

## 2025-07-12 VITALS
BODY MASS INDEX: 23.55 KG/M2 | SYSTOLIC BLOOD PRESSURE: 138 MMHG | OXYGEN SATURATION: 98 % | RESPIRATION RATE: 16 BRPM | HEART RATE: 85 BPM | DIASTOLIC BLOOD PRESSURE: 90 MMHG | TEMPERATURE: 97 F | WEIGHT: 159 LBS | HEIGHT: 69 IN

## 2025-07-12 DIAGNOSIS — M70.32 BURSITIS OF OTHER BURSA OF LEFT ELBOW: ICD-10-CM

## 2025-07-12 DIAGNOSIS — L08.9 WOUND INFECTION: Primary | ICD-10-CM

## 2025-07-12 DIAGNOSIS — T14.8XXA WOUND INFECTION: Primary | ICD-10-CM

## 2025-07-12 PROCEDURE — 3080F DIAST BP >= 90 MM HG: CPT | Performed by: NURSE PRACTITIONER

## 2025-07-12 PROCEDURE — 99214 OFFICE O/P EST MOD 30 MIN: CPT | Performed by: NURSE PRACTITIONER

## 2025-07-12 PROCEDURE — 3075F SYST BP GE 130 - 139MM HG: CPT | Performed by: NURSE PRACTITIONER

## 2025-07-12 RX ORDER — SULFAMETHOXAZOLE AND TRIMETHOPRIM 800; 160 MG/1; MG/1
1 TABLET ORAL 2 TIMES DAILY
Qty: 14 TABLET | Refills: 0 | Status: SHIPPED | OUTPATIENT
Start: 2025-07-12 | End: 2025-07-19

## 2025-07-12 RX ORDER — MUPIROCIN 2 %
1 OINTMENT (GRAM) TOPICAL 2 TIMES DAILY
Qty: 22 G | Refills: 0 | Status: SHIPPED | OUTPATIENT
Start: 2025-07-12

## 2025-07-12 ASSESSMENT — ENCOUNTER SYMPTOMS
TINGLING: 0
FOCAL WEAKNESS: 0
SENSORY CHANGE: 0

## 2025-07-12 ASSESSMENT — FIBROSIS 4 INDEX: FIB4 SCORE: 1.1

## 2025-07-12 NOTE — PROGRESS NOTES
Tereza Carl II is a 44 y.o. male who presents with Wound Infection (Possible infected wound on Left elbow from fall 2 weeks ago. Patient hit elbow on rock. )            HPI  New problem.  Patient is a very pleasant 44-year-old male who presents with possible wound infection to his left elbow.  He had a fall on this elbow approximately 2 weeks ago and sustained an open wound which now has some surrounding erythema.  He also endorses swelling of this left elbow.  He denies any pain and the swelling itself is not erythematous or tender to palpation.  He has not been doing any treatment to this thus far.    Patient has no known allergies.  Medications Ordered Prior to Encounter[1]  Social History     Socioeconomic History    Marital status:      Spouse name: Not on file    Number of children: Not on file    Years of education: Not on file    Highest education level: Not on file   Occupational History    Not on file   Tobacco Use    Smoking status: Former     Types: Cigarettes    Smokeless tobacco: Never    Tobacco comments:     Stopped smoking 12+ years ago. Vape only   Vaping Use    Vaping status: Every Day    Substances: Nicotine   Substance and Sexual Activity    Alcohol use: Yes     Comment: 2-3 a day     Drug use: Not Currently    Sexual activity: Not on file   Other Topics Concern    Not on file   Social History Narrative    Not on file     Social Drivers of Health     Financial Resource Strain: Not on file   Food Insecurity: Not on file   Transportation Needs: Not on file   Physical Activity: Not on file   Stress: Not on file   Social Connections: Not on file   Intimate Partner Violence: Not on file   Housing Stability: Not on file     Family History   Problem Relation Age of Onset    Sleep Apnea Neg Hx          Review of Systems   Musculoskeletal:         Swelling to left elbow- consistent with bursitis.    Skin:         Wound to left elbow   Neurological:  Negative for tingling,  "sensory change and focal weakness.   All other systems reviewed and are negative.             Objective     BP (!) 138/90   Pulse 85   Temp 36.1 °C (97 °F) (Temporal)   Resp 16   Ht 1.753 m (5' 9\")   Wt 72.1 kg (159 lb)   SpO2 98%   BMI 23.48 kg/m²      Physical Exam  Constitutional:       Appearance: Normal appearance.   Cardiovascular:      Rate and Rhythm: Normal rate and regular rhythm.      Heart sounds: No murmur heard.  Pulmonary:      Effort: Pulmonary effort is normal.      Breath sounds: Normal breath sounds.   Musculoskeletal:      Left elbow: Swelling present. Normal range of motion. No tenderness.   Skin:     General: Skin is warm and dry.      Findings: Erythema present.      Comments: Patient within open wound to his left elbow that is scabbed over.  There is surrounding erythema to this area indicative of possible infection.   Neurological:      Mental Status: He is alert.                                  Assessment & Plan       1. Wound infection  sulfamethoxazole-trimethoprim (BACTRIM DS) 800-160 MG tablet    mupirocin (BACTROBAN) 2 % Ointment      2. Bursitis of other bursa of left elbow  Referral to Sports Medicine      Patient will be treated with Bactrim and mupirocin ointment for the wound infection to this elbow.  The swelling that is noted on exam is likely bursitis and he is advised on icing and anti-inflammatories for this.  He is also given a referral to sports medicine for possible drainage.  Patient is verbalized understanding of these instructions and is advised that if the wound does not look better in the next 48 hours he is to return to the clinic.                   [1]   Current Outpatient Medications on File Prior to Visit   Medication Sig Dispense Refill    omeprazole (PRILOSEC) 40 MG delayed-release capsule Take 1 Capsule by mouth every morning before breakfast. 90 Capsule 2    escitalopram (LEXAPRO) 10 MG Tab Take 1 Tablet by mouth every day. 90 Tablet 2    omeprazole " (PRILOSEC) 40 MG delayed-release capsule Take 1 capsule (40 mg) by mouth every morning before breakfast. 90 Capsule 3    MULTIPLE VITAMINS-MINERALS ER PO Take 50 mg by mouth every day.      escitalopram (LEXAPRO) 10 MG Tab Take 1 Tablet by mouth every day.      meloxicam (MOBIC) 15 MG tablet TAKE 1 TABLET BY MOUTH EVERY DAY (Patient not taking: Reported on 7/12/2025) 30 Tablet 0    amLODIPine (NORVASC) 2.5 MG Tab Take 1 Tablet by mouth every day. (Patient not taking: Reported on 2/28/2025) 90 Tablet 2     No current facility-administered medications on file prior to visit.

## 2025-07-16 ENCOUNTER — OFFICE VISIT (OUTPATIENT)
Dept: SLEEP MEDICINE | Facility: MEDICAL CENTER | Age: 45
End: 2025-07-16
Attending: NURSE PRACTITIONER
Payer: COMMERCIAL

## 2025-07-16 VITALS
SYSTOLIC BLOOD PRESSURE: 140 MMHG | WEIGHT: 158 LBS | OXYGEN SATURATION: 98 % | HEART RATE: 66 BPM | DIASTOLIC BLOOD PRESSURE: 76 MMHG | BODY MASS INDEX: 23.4 KG/M2 | HEIGHT: 69 IN

## 2025-07-16 DIAGNOSIS — G47.33 OBSTRUCTIVE SLEEP APNEA: Primary | Chronic | ICD-10-CM

## 2025-07-16 DIAGNOSIS — S09.92XS BLUNT TRAUMA OF NOSE, SEQUELA: ICD-10-CM

## 2025-07-16 DIAGNOSIS — J34.89 OBSTRUCTION OF PARANASAL SINUS: ICD-10-CM

## 2025-07-16 DIAGNOSIS — Z87.891 FORMER SMOKER: ICD-10-CM

## 2025-07-16 PROCEDURE — 3077F SYST BP >= 140 MM HG: CPT | Performed by: NURSE PRACTITIONER

## 2025-07-16 PROCEDURE — 99213 OFFICE O/P EST LOW 20 MIN: CPT | Performed by: NURSE PRACTITIONER

## 2025-07-16 PROCEDURE — 99214 OFFICE O/P EST MOD 30 MIN: CPT | Performed by: NURSE PRACTITIONER

## 2025-07-16 PROCEDURE — 3078F DIAST BP <80 MM HG: CPT | Performed by: NURSE PRACTITIONER

## 2025-07-16 ASSESSMENT — PATIENT HEALTH QUESTIONNAIRE - PHQ9: CLINICAL INTERPRETATION OF PHQ2 SCORE: 0

## 2025-07-16 ASSESSMENT — FIBROSIS 4 INDEX: FIB4 SCORE: 1.1

## 2025-07-16 NOTE — PROGRESS NOTES
Chief Complaint   Patient presents with    Follow-Up     Last Office Visit 12/12/24 with Molly Cash     DME: [facility]    PAP/O2/OAT: auto bipap ipap 24 epap 11 ps 4    Wireless Data? YES-[employer] compliance uploaded   Set up DATE: 7/26/24     Other     Patient states that even when he is using the BIPAP he is still tired        HPI:  Mert Carl II is a 44 y.o. year old male here today for follow-up on JUAN MANUEL.  Last OV 12/12/24     Currently using AutoBIPAP @ 24/11cm, PS4cm H20 nightly; RESMED; device obtained 7/2024.   Compliance report 6/15/2025 through 7/14/2025 indicates 22 nights of use, 13 nights greater than 4 hours, average nightly use 4 hours 45 minutes, moderate to significant mask with reduced AHI 1.1/h.  Reviewed with patient.    Interval events:  7/16/25: Wife accompanies him.  He recently underwent a job change in May to June and noticed increased stress which changed his sleep schedule.  He was only sleeping 2 hours at a time.  In the last week his sleep schedule has improved going to bed at 10 PM and waking at 6 AM to help care for their young child.  His wife feels he is going into REM rebound because he is not had a consistent sleep to go.  He denies waking with headaches or shortness of breath.  He continues to wake 4-5 times at night notes significant dry mouth.  His wife feels he could be snoring with BiPAP on which may be positional since he sleeps on his back and if his head is too far forward or to the side.  He tends to fall asleep on the couch.  He would like new referral to ENT since he has new insurance.  He does have a history of blunt trauma to the nose and cannot breathe through his right nare.  12/12/24: Wife accompanies him. RLS/PLMD labs normal. Ultimately could still benefit from sinus surgery. Tolerating BIPAP better. Need to work on consistency.  [place] an average 7 hours per night but was still wake up feeling exhausted.   His wife noticed color changes in his face and he would take a nap and wake up and he would be more pink again.  Review of lab work from this November notes anemia with low RBCs and H&H.  His T4 is also low.  Recommend follow-up with endocrinology and possibly hematology to address.  His overall this device is more comfortable.  He is not having significant sinus issues with it.    5/8/2024: Patient was seen by ENT who notes a severe deviated septum and does recommend surgery in the next 2 to 3 months.  Patient would like to pursue this.  Inspire implant was also discussed by ENT and reviewed in detail with patient.  2/8/2024: Dry mouth and obtained chinstrap.  Waking 3-4 times at night due to dry mouth.  Consistent bedtime schedule going to bed at 10 PM, falling asleep quickly, waking 2-3 times at night due to dry mouth and resuming sleep till 5:30 AM for work.     SLEEP HX:  PSG split night 2/27/21 noted sleep efficiency of 50%, overall AHI 51.4/h and REM AHI 52.71/h with supine AHI 57.01/h.  39% events were central.  PLMS index of 23.1/h.  Mean SPO2 97% with O2 mikal of 89%.  Patient was titrated on CPAP and BiPAP with best tolerated pressure BiPAP 18/13 cm.  AHI improved to 3.53/h and O2 mikal of 95%.  Treatment emergent central apneas noted.  Overall incomplete titration study.      ROS: As per HPI and otherwise negative if not stated.    Past Medical History[1]    Past Surgical History[2]    Family History   Problem Relation Age of Onset    Sleep Apnea Neg Hx        Social History     Socioeconomic History    Marital status:      Spouse name: Not on file    Number of children: Not on file    Years of education: Not on file    Highest education level: Not on file   Occupational History    Not on file   Tobacco Use    Smoking status: Former     Types: Cigarettes    Smokeless tobacco: Never    Tobacco comments:     Stopped smoking 12+ years ago. Vape only   Vaping Use    Vaping status: Every Day     "Substances: Nicotine   Substance and Sexual Activity    Alcohol use: Yes     Comment: 2-3 a day     Drug use: Not Currently    Sexual activity: Not on file   Other Topics Concern    Not on file   Social History Narrative    Not on file     Social Drivers of Health     Financial Resource Strain: Not on file   Food Insecurity: Not on file   Transportation Needs: Not on file   Physical Activity: Not on file   Stress: Not on file   Social Connections: Not on file   Intimate Partner Violence: Not on file   Housing Stability: Not on file       Allergies as of 07/16/2025    (No Known Allergies)        Vitals:  BP (!) 140/76   Pulse 66   Ht 1.753 m (5' 9\")   Wt 71.7 kg (158 lb)   SpO2 98%     Current medications as of today Current Medications[3]      Physical Exam:   Gen:           Alert and oriented, No apparent distress. Mood and affect appropriate, normal interaction with examiner.  Eyes:          PERRL, EOM intact, sclere white, conjunctive moist.  Ears:          Not examined.   Hearing:     Grossly intact.  Nose:          Normal, no lesions or deformities. Right nare blockage from deviated septum  Dentition:    Not examined.   Oropharynx:   Not examined.   Mallampati Classification: Not examined.   Neck:        Supple, trachea midline, no masses.  Respiratory Effort: No intercostal retractions or use of accessory muscles.   Lung Auscultation:      Clear to auscultation bilaterally; no rales, rhonchi or wheezing.  CV:            Regular rate and rhythm. No murmurs, rubs or gallops.  Abd:           Not examined.   Lymphadenopathy: Not examined.  Gait and Station: Normal.  Digits and Nails: No clubbing, cyanosis, petechiae, or nodes.   Cranial Nerves: II-XII grossly intact.  Skin:        No rashes, lesions or ulcers noted.               Ext:           No cyanosis or edema.      Assessment:  1. Obstructive sleep apnea  Referral to ENT    DME Mask and Supplies      2. Blunt trauma of nose, sequela  Referral to ENT    "   3. Obstruction of paranasal sinus  Referral to ENT      4. BMI 23.0-23.9, adult        5. Former smoker            Immunizations:    Flu:11/2024  COVID-19: 2021    Plan:  JUAN MANUEL is improved on therapy.  He will continue to benefit from nightly BiPAP use.  Reviewed the need for consistent nightly use for optimal benefit.     DME mask/supplies  Recommend referral back to ENT due to insurance change to have possible evaluation for sinus surgery to correct deviated septum from prior trauma.  This is a significant component of his sleep apnea and having chronic dry mouth and mouth breathing.   REFERRAL ENT  See above  Healthy diet and activity for conditioning.  Reviewed sleep hygiene.  Follow-up in 3 months to review compliance and ENT notes, sooner if needed.    Please note that this dictation was created using voice recognition software. I have made every reasonable attempt to correct obvious errors, but it is possible there are errors of grammar and possibly content that I did not discover before finalizing the note.         [1]   Past Medical History:  Diagnosis Date    Chickenpox    [2] History reviewed. No pertinent surgical history.  [3]   Current Outpatient Medications   Medication Sig Dispense Refill    sulfamethoxazole-trimethoprim (BACTRIM DS) 800-160 MG tablet Take 1 Tablet by mouth 2 times a day for 7 days. 14 Tablet 0    mupirocin (BACTROBAN) 2 % Ointment Apply 1 Application topically 2 times a day. 22 g 0    omeprazole (PRILOSEC) 40 MG delayed-release capsule Take 1 Capsule by mouth every morning before breakfast. 90 Capsule 2    escitalopram (LEXAPRO) 10 MG Tab Take 1 Tablet by mouth every day. 90 Tablet 2    omeprazole (PRILOSEC) 40 MG delayed-release capsule Take 1 capsule (40 mg) by mouth every morning before breakfast. 90 Capsule 3    MULTIPLE VITAMINS-MINERALS ER PO Take 50 mg by mouth every day.      escitalopram (LEXAPRO) 10 MG Tab Take 1 Tablet by mouth every day.       No current  facility-administered medications for this visit.

## 2025-07-16 NOTE — PATIENT INSTRUCTIONS
Referral ENT now    Continue current BIPAP pressures    Sleep hygiene discussed.  Recommend keeping a set sleep/wake schedule.  Long enough hours of sleep.  Limiting/avoiding naps.  No caffeine afternoon and no heavy meals in the evening.  Recommend daily exercise.  Keep consistent sleep schedule  Do stress relief in evening to wind down for evening

## 2025-07-16 NOTE — Clinical Note
REFERRAL APPROVAL NOTICE         Sent on July 16, 2025                   Mahin Carl II  [address] [place] [address]                   Dear Mr. Carl,    After a careful review of the medical information and benefit coverage, [facility] has processed your referral. See below for additional details.    If applicable, you must be actively enrolled with your insurance for coverage of the authorized service. If you have any questions regarding your coverage, please contact your insurance directly.    REFERRAL INFORMATION   Referral #:  [id number]  Referred-To Provider    Referred-By Provider:  Otolaryngology    JUAN ANTONIO Elizabeth JEREMY D      [address] [place] [address]  [place] [address]  [phone] [address] [facility]  [place] [address]  [phone]    Referral Start Date:  07/16/2025  Referral End Date:   07/16/2026             SCHEDULING  If you do not already have an appointment, please call [phone] to make an appointment.     MORE INFORMATION  If you do not already have a [employer] account, sign up at: [email or web address]  You can access your medical information, make appointments, see lab results, billing information, and more.  If you have questions regarding this referral, please contact  the [facility] Referrals department at:             [phone]. Monday - Friday 8:00AM - 5:00PM.     Sincerely,    [facility]

## 2025-07-17 NOTE — Clinical Note
REFERRAL APPROVAL NOTICE         Sent on July 17, 2025                   Mahin Carl II  [address] [place] [address]                   Dear Mr. Carl,    After a careful review of the medical information and benefit coverage, [facility] has processed your referral. See below for additional details.    If applicable, you must be actively enrolled with your insurance for coverage of the authorized service. If you have any questions regarding your coverage, please contact your insurance directly.    REFERRAL INFORMATION   Referral #:  [id number]  Referred-To Department    Referred-By Provider:  Sports Medicine    ROBIN Whitney   [address]      [address]  [place] [address]  [phone] [address]  [place] [id number] [phone]    Referral Start Date:  07/12/2025  Referral End Date:   07/12/2026             SCHEDULING  If you do not already have an appointment, please call [phone] to make an appointment.     MORE INFORMATION  If you do not already have a [facility] account, sign up at: [email or web address]  You can access your medical information, make appointments, see lab results, billing information, and more.  If you have questions regarding this referral, please contact  the [facility] Referrals department at:             [phone]. Monday - Friday 8:00AM - 5:00PM.     Sincerely,    [facility]

## 2025-07-18 ENCOUNTER — TELEPHONE (OUTPATIENT)
Dept: HEALTH INFORMATION MANAGEMENT | Facility: OTHER | Age: 45
End: 2025-07-18
Payer: COMMERCIAL

## 2025-07-22 ENCOUNTER — OFFICE VISIT (OUTPATIENT)
Dept: SPORTS MEDICINE | Facility: OTHER | Age: 45
End: 2025-07-22
Attending: NURSE PRACTITIONER
Payer: COMMERCIAL

## 2025-07-22 VITALS
SYSTOLIC BLOOD PRESSURE: 124 MMHG | OXYGEN SATURATION: 96 % | BODY MASS INDEX: 23.4 KG/M2 | HEART RATE: 89 BPM | DIASTOLIC BLOOD PRESSURE: 82 MMHG | WEIGHT: 158 LBS | HEIGHT: 69 IN | RESPIRATION RATE: 16 BRPM | TEMPERATURE: 98.7 F

## 2025-07-22 DIAGNOSIS — M70.22 OLECRANON BURSITIS OF LEFT ELBOW: Primary | ICD-10-CM

## 2025-07-22 PROCEDURE — 99214 OFFICE O/P EST MOD 30 MIN: CPT | Performed by: FAMILY MEDICINE

## 2025-07-22 PROCEDURE — 3074F SYST BP LT 130 MM HG: CPT | Performed by: FAMILY MEDICINE

## 2025-07-22 PROCEDURE — 3079F DIAST BP 80-89 MM HG: CPT | Performed by: FAMILY MEDICINE

## 2025-07-22 SDOH — ECONOMIC STABILITY: FOOD INSECURITY: WITHIN THE PAST 12 MONTHS, THE FOOD YOU BOUGHT JUST DIDN'T LAST AND YOU DIDN'T HAVE MONEY TO GET MORE.: PATIENT DECLINED

## 2025-07-22 SDOH — ECONOMIC STABILITY: TRANSPORTATION INSECURITY
IN THE PAST 12 MONTHS, HAS LACK OF TRANSPORTATION KEPT YOU FROM MEETINGS, WORK, OR FROM GETTING THINGS NEEDED FOR DAILY LIVING?: PATIENT DECLINED

## 2025-07-22 SDOH — ECONOMIC STABILITY: INCOME INSECURITY: HOW HARD IS IT FOR YOU TO PAY FOR THE VERY BASICS LIKE FOOD, HOUSING, MEDICAL CARE, AND HEATING?: PATIENT DECLINED

## 2025-07-22 SDOH — ECONOMIC STABILITY: INCOME INSECURITY: IN THE LAST 12 MONTHS, WAS THERE A TIME WHEN YOU WERE NOT ABLE TO PAY THE MORTGAGE OR RENT ON TIME?: PATIENT DECLINED

## 2025-07-22 SDOH — HEALTH STABILITY: PHYSICAL HEALTH: ON AVERAGE, HOW MANY DAYS PER WEEK DO YOU ENGAGE IN MODERATE TO STRENUOUS EXERCISE (LIKE A BRISK WALK)?: 5 DAYS

## 2025-07-22 SDOH — HEALTH STABILITY: PHYSICAL HEALTH: ON AVERAGE, HOW MANY MINUTES DO YOU ENGAGE IN EXERCISE AT THIS LEVEL?: PATIENT DECLINED

## 2025-07-22 SDOH — ECONOMIC STABILITY: FOOD INSECURITY: WITHIN THE PAST 12 MONTHS, YOU WORRIED THAT YOUR FOOD WOULD RUN OUT BEFORE YOU GOT MONEY TO BUY MORE.: PATIENT DECLINED

## 2025-07-22 SDOH — ECONOMIC STABILITY: TRANSPORTATION INSECURITY
IN THE PAST 12 MONTHS, HAS LACK OF RELIABLE TRANSPORTATION KEPT YOU FROM MEDICAL APPOINTMENTS, MEETINGS, WORK OR FROM GETTING THINGS NEEDED FOR DAILY LIVING?: PATIENT DECLINED

## 2025-07-22 SDOH — ECONOMIC STABILITY: HOUSING INSECURITY
IN THE LAST 12 MONTHS, WAS THERE A TIME WHEN YOU DID NOT HAVE A STEADY PLACE TO SLEEP OR SLEPT IN A SHELTER (INCLUDING NOW)?: PATIENT DECLINED

## 2025-07-22 SDOH — ECONOMIC STABILITY: TRANSPORTATION INSECURITY
IN THE PAST 12 MONTHS, HAS THE LACK OF TRANSPORTATION KEPT YOU FROM MEDICAL APPOINTMENTS OR FROM GETTING MEDICATIONS?: PATIENT DECLINED

## 2025-07-22 SDOH — HEALTH STABILITY: MENTAL HEALTH
STRESS IS WHEN SOMEONE FEELS TENSE, NERVOUS, ANXIOUS, OR CAN'T SLEEP AT NIGHT BECAUSE THEIR MIND IS TROUBLED. HOW STRESSED ARE YOU?: PATIENT DECLINED

## 2025-07-22 ASSESSMENT — SOCIAL DETERMINANTS OF HEALTH (SDOH)
HOW OFTEN DO YOU GET TOGETHER WITH FRIENDS OR RELATIVES?: PATIENT DECLINED
HOW MANY DRINKS CONTAINING ALCOHOL DO YOU HAVE ON A TYPICAL DAY WHEN YOU ARE DRINKING: PATIENT DECLINED
DO YOU BELONG TO ANY CLUBS OR ORGANIZATIONS SUCH AS CHURCH GROUPS UNIONS, FRATERNAL OR ATHLETIC GROUPS, OR SCHOOL GROUPS?: PATIENT DECLINED
IN THE PAST 12 MONTHS, HAS THE ELECTRIC, GAS, OIL, OR WATER COMPANY THREATENED TO SHUT OFF SERVICE IN YOUR HOME?: PATIENT DECLINED
IN A TYPICAL WEEK, HOW MANY TIMES DO YOU TALK ON THE PHONE WITH FAMILY, FRIENDS, OR NEIGHBORS?: PATIENT DECLINED
HOW OFTEN DO YOU ATTENT MEETINGS OF THE CLUB OR ORGANIZATION YOU BELONG TO?: PATIENT DECLINED
HOW OFTEN DO YOU ATTEND CHURCH OR RELIGIOUS SERVICES?: PATIENT DECLINED
WITHIN THE PAST 12 MONTHS, YOU WORRIED THAT YOUR FOOD WOULD RUN OUT BEFORE YOU GOT THE MONEY TO BUY MORE: PATIENT DECLINED
HOW OFTEN DO YOU ATTEND CHURCH OR RELIGIOUS SERVICES?: PATIENT DECLINED
HOW OFTEN DO YOU HAVE SIX OR MORE DRINKS ON ONE OCCASION: PATIENT DECLINED
HOW HARD IS IT FOR YOU TO PAY FOR THE VERY BASICS LIKE FOOD, HOUSING, MEDICAL CARE, AND HEATING?: PATIENT DECLINED
HOW OFTEN DO YOU ATTENT MEETINGS OF THE CLUB OR ORGANIZATION YOU BELONG TO?: PATIENT DECLINED
HOW OFTEN DO YOU GET TOGETHER WITH FRIENDS OR RELATIVES?: PATIENT DECLINED
HOW OFTEN DO YOU HAVE A DRINK CONTAINING ALCOHOL: PATIENT DECLINED
IN A TYPICAL WEEK, HOW MANY TIMES DO YOU TALK ON THE PHONE WITH FAMILY, FRIENDS, OR NEIGHBORS?: PATIENT DECLINED
DO YOU BELONG TO ANY CLUBS OR ORGANIZATIONS SUCH AS CHURCH GROUPS UNIONS, FRATERNAL OR ATHLETIC GROUPS, OR SCHOOL GROUPS?: PATIENT DECLINED

## 2025-07-22 ASSESSMENT — FIBROSIS 4 INDEX: FIB4 SCORE: 1.1

## 2025-07-22 ASSESSMENT — ENCOUNTER SYMPTOMS
CHILLS: 0
VOMITING: 0
FEVER: 0
SHORTNESS OF BREATH: 0
DIZZINESS: 0
NAUSEA: 0

## 2025-07-22 ASSESSMENT — LIFESTYLE VARIABLES
SKIP TO QUESTIONS 9-10: 0
AUDIT-C TOTAL SCORE: -1
HOW OFTEN DO YOU HAVE SIX OR MORE DRINKS ON ONE OCCASION: PATIENT DECLINED
HOW OFTEN DO YOU HAVE A DRINK CONTAINING ALCOHOL: PATIENT DECLINED
HOW MANY STANDARD DRINKS CONTAINING ALCOHOL DO YOU HAVE ON A TYPICAL DAY: PATIENT DECLINED

## 2025-07-22 NOTE — PROGRESS NOTES
"Chief Complaint   Patient presents with    Elbow Pain     L elbow pain      Subjective     Referred by YONG Gabriel  for evaluation of LEFT elbow pain (right-handed dominant)  Date of injury, June 28, 2025  Slipped and hit his LEFT elbow on a rock upon landing   Pain is sharp, mostly when he bumps the area  He did experience some swelling  He expelled some serosanguineous yellowish fluid from the swelling  Pain is mostly sharp when he bumps it  Otherwise is not really experiencing pain  No radiation, stays at the olecranon region of the elbow  No numbness or tingling  No imaging has been performed  He was prescribed antibiotic pills and cream at the urgent care  He was seen at urgent care on July 12, 2025     / and   Raising a 2-year-old    Review of Systems   Constitutional:  Negative for chills and fever.   Respiratory:  Negative for shortness of breath.    Cardiovascular:  Negative for chest pain.   Gastrointestinal:  Negative for nausea and vomiting.   Neurological:  Negative for dizziness.     PMH:  has a past medical history of Chickenpox and GERD (gastroesophageal reflux disease).  MEDS: Current Medications[1]  ALLERGIES: Allergies[2]  SURGHX: Past Surgical History[3]  SOCHX:  reports that he has quit smoking. His smoking use included cigarettes. He has never used smokeless tobacco. He reports current alcohol use. He reports that he does not currently use drugs.  FH: Family history was reviewed, no pertinent findings to report    Objective   /82 (BP Location: Left arm, Patient Position: Sitting, BP Cuff Size: Adult)   Pulse 89   Temp 37.1 °C (98.7 °F) (Temporal)   Resp 16   Ht 1.753 m (5' 9\")   Wt 71.7 kg (158 lb)   SpO2 96%   BMI 23.33 kg/m²     No acute distress  Alert and oriented ×3    BILATERAL Shoulder exam:  Range of motion INTACT  Strength testing NORMAL with empty can, internal rotation, external rotation and lift off testing  NO tenderness " of the supraspinatus, infraspinatus or biceps tendon  Apprehension testing NORMAL    BILATERAL ELBOW exam  Range of motion intact  POSITIVE swelling of the LEFT elbow, minimal pinkish erythema at abrasion point  No tenderness of the medial or lateral epicondyle  Resisted finger extension test is NEGATIVE    1. Olecranon bursitis of left elbow          Date of injury, June 28, 2025  Slipped and hit his LEFT elbow on a rock upon landing   Pain is sharp, mostly when he bumps the area    Still has a few days left of antibiotics  Encouraged to use compression sleeve for the elbow  Voltaren gel for anti-inflammatory effect  Discontinue topical antibiotic since the wound seems to be closed  Continue oral antibiotic    Return in about 1 week (around 7/29/2025).  At that point he will be 5 days post oral antibiotic round  If he continues to have swelling we will proceed with aspiration/corticosteroid injection in the LEFT olecranon bursa under ultrasound guidance    Imaging has NOT been performed    Thank you YONG Gabriel for allowing me to participate in caring for your patient.           [1]   Current Outpatient Medications:     mupirocin (BACTROBAN) 2 % Ointment, Apply 1 Application topically 2 times a day., Disp: 22 g, Rfl: 0    omeprazole (PRILOSEC) 40 MG delayed-release capsule, Take 1 Capsule by mouth every morning before breakfast., Disp: 90 Capsule, Rfl: 2    escitalopram (LEXAPRO) 10 MG Tab, Take 1 Tablet by mouth every day., Disp: 90 Tablet, Rfl: 2    omeprazole (PRILOSEC) 40 MG delayed-release capsule, Take 1 capsule (40 mg) by mouth every morning before breakfast., Disp: 90 Capsule, Rfl: 3    MULTIPLE VITAMINS-MINERALS ER PO, Take 50 mg by mouth every day., Disp: , Rfl:     escitalopram (LEXAPRO) 10 MG Tab, Take 1 Tablet by mouth every day., Disp: , Rfl:   [2] No Known Allergies  [3] History reviewed. No pertinent surgical history.

## 2025-07-29 ENCOUNTER — OFFICE VISIT (OUTPATIENT)
Dept: SPORTS MEDICINE | Facility: OTHER | Age: 45
End: 2025-07-29
Payer: COMMERCIAL

## 2025-07-29 VITALS
HEART RATE: 84 BPM | RESPIRATION RATE: 18 BRPM | SYSTOLIC BLOOD PRESSURE: 122 MMHG | TEMPERATURE: 98.2 F | HEIGHT: 69 IN | BODY MASS INDEX: 23.4 KG/M2 | DIASTOLIC BLOOD PRESSURE: 82 MMHG | WEIGHT: 158 LBS | OXYGEN SATURATION: 95 %

## 2025-07-29 DIAGNOSIS — M70.22 OLECRANON BURSITIS OF LEFT ELBOW: Primary | ICD-10-CM

## 2025-07-29 RX ORDER — TRIAMCINOLONE ACETONIDE 40 MG/ML
20 INJECTION, SUSPENSION INTRA-ARTICULAR; INTRAMUSCULAR ONCE
Status: COMPLETED | OUTPATIENT
Start: 2025-07-29 | End: 2025-07-29

## 2025-07-29 RX ADMIN — TRIAMCINOLONE ACETONIDE 20 MG: 40 INJECTION, SUSPENSION INTRA-ARTICULAR; INTRAMUSCULAR at 15:09

## 2025-07-29 ASSESSMENT — FIBROSIS 4 INDEX: FIB4 SCORE: 1.1

## 2025-07-29 NOTE — PROGRESS NOTES
"Chief Complaint   Patient presents with    Elbow Pain     L elbow pain      Subjective     Referred by YONG Gabriel  for evaluation of LEFT elbow pain (right-handed dominant)  Date of injury, June 28, 2025  Slipped and hit his LEFT elbow on a rock upon landing   Pain is sharp, mostly when he bumps the area  He did experience some swelling  He expelled some serosanguineous yellowish fluid from the swelling  Pain is mostly sharp when he bumps it  Otherwise is not really experiencing pain  No radiation, stays at the olecranon region of the elbow  No numbness or tingling  No imaging has been performed  He was prescribed antibiotic pills and cream at the urgent care  He was seen at urgent care on July 12, 2025     / and   Raising a 2-year-old    UPDATE:  LEFT elbow swelling PERSIST  Still feels uncomfortable when he bumps the elbow  Fortunately, redness has gone away despite finishing his antibiotic treatment    Objective   /82 (BP Location: Left arm, Patient Position: Sitting, BP Cuff Size: Adult)   Pulse 84   Temp 36.8 °C (98.2 °F) (Temporal)   Resp 18   Ht 1.753 m (5' 9\")   Wt 71.7 kg (158 lb)   SpO2 95%   BMI 23.33 kg/m²     No acute distress  Alert and oriented ×3    BILATERAL ELBOW exam  Range of motion intact  CONTINUED swelling of the LEFT elbow, minimal pinkish erythema at abrasion point  No tenderness of the medial or lateral epicondyle  Resisted finger extension test is NEGATIVE    1. Olecranon bursitis of left elbow  triamcinolone acetonide (Kenalog-40) injection 20 mg    US-POCT US ARTHROCENTESIS ASP/INJ MEDIUM JOINT/BURSA        Date of injury, June 28, 2025  Slipped and hit his LEFT elbow on a rock upon landing   Pain is sharp, mostly when he bumps the area    LEFT elbow swelling PERSIST  Still feels uncomfortable when he bumps the elbow  Fortunately, redness has gone away despite finishing his antibiotic treatment    LEFT olecranon bursa " ASPIRATION with corticosteroid injection performed in the office TODAY (July 29, 2025)    PROCEDURE NOTE:  LEFT elbow olecranon bursa corticosteroid aspiration/injection  Consent was obtained, using sterile technique the LEFT elbow was prepped   Vapocoolant spray for anesthesia  18 G needle for aspiration of 5 cc of straw colored fluid.  Steroid kenalog 20 mg and 0.5 ml plain bupivacaine was then injected and the needle withdrawn.  The procedure was well tolerated.    Watch for fever, or increased swelling or persistent pain in knee. Call or return to clinic prn if such symptoms occur or the knee fails to improve as anticipated.        Imaging has NOT been performed    Thank you YONG Gabriel for allowing me to participate in caring for your patient.